# Patient Record
Sex: FEMALE | Race: WHITE | Employment: OTHER | URBAN - METROPOLITAN AREA
[De-identification: names, ages, dates, MRNs, and addresses within clinical notes are randomized per-mention and may not be internally consistent; named-entity substitution may affect disease eponyms.]

---

## 2017-01-03 ENCOUNTER — OFFICE VISIT (OUTPATIENT)
Dept: FAMILY MEDICINE | Facility: CLINIC | Age: 72
End: 2017-01-03
Payer: COMMERCIAL

## 2017-01-03 VITALS
WEIGHT: 139 LBS | SYSTOLIC BLOOD PRESSURE: 134 MMHG | HEIGHT: 64 IN | HEART RATE: 78 BPM | BODY MASS INDEX: 23.73 KG/M2 | OXYGEN SATURATION: 99 % | DIASTOLIC BLOOD PRESSURE: 82 MMHG | TEMPERATURE: 95 F

## 2017-01-03 DIAGNOSIS — M32.9 SYSTEMIC LUPUS ERYTHEMATOSUS (H): ICD-10-CM

## 2017-01-03 DIAGNOSIS — E78.5 HYPERLIPIDEMIA LDL GOAL <130: Primary | ICD-10-CM

## 2017-01-03 DIAGNOSIS — I77.6 VASCULITIS (H): ICD-10-CM

## 2017-01-03 DIAGNOSIS — Z79.899 HIGH RISK MEDICATION USE: ICD-10-CM

## 2017-01-03 DIAGNOSIS — Z29.89 NEED FOR SUBACUTE BACTERIAL ENDOCARDITIS PROPHYLAXIS: ICD-10-CM

## 2017-01-03 DIAGNOSIS — R73.02 IMPAIRED GLUCOSE TOLERANCE: ICD-10-CM

## 2017-01-03 DIAGNOSIS — R26.9 GAIT DIFFICULTY: ICD-10-CM

## 2017-01-03 LAB
ALT SERPL W P-5'-P-CCNC: 32 U/L (ref 0–50)
AST SERPL W P-5'-P-CCNC: 29 U/L (ref 0–45)
CHOLEST SERPL-MCNC: 210 MG/DL
CK SERPL-CCNC: 151 U/L (ref 30–225)
HBA1C MFR BLD: 5.9 % (ref 4.3–6)
HDLC SERPL-MCNC: 104 MG/DL
LDLC SERPL CALC-MCNC: 69 MG/DL
NONHDLC SERPL-MCNC: 106 MG/DL
TRIGL SERPL-MCNC: 185 MG/DL

## 2017-01-03 PROCEDURE — 82550 ASSAY OF CK (CPK): CPT | Performed by: FAMILY MEDICINE

## 2017-01-03 PROCEDURE — 84450 TRANSFERASE (AST) (SGOT): CPT | Performed by: FAMILY MEDICINE

## 2017-01-03 PROCEDURE — 84460 ALANINE AMINO (ALT) (SGPT): CPT | Performed by: FAMILY MEDICINE

## 2017-01-03 PROCEDURE — 83036 HEMOGLOBIN GLYCOSYLATED A1C: CPT | Performed by: FAMILY MEDICINE

## 2017-01-03 PROCEDURE — 80061 LIPID PANEL: CPT | Performed by: FAMILY MEDICINE

## 2017-01-03 PROCEDURE — 99213 OFFICE O/P EST LOW 20 MIN: CPT | Performed by: FAMILY MEDICINE

## 2017-01-03 PROCEDURE — 36415 COLL VENOUS BLD VENIPUNCTURE: CPT | Performed by: FAMILY MEDICINE

## 2017-01-03 RX ORDER — CEPHALEXIN 500 MG/1
CAPSULE ORAL
Qty: 4 CAPSULE | Refills: 3 | Status: SHIPPED | OUTPATIENT
Start: 2017-01-03 | End: 2018-03-06

## 2017-01-03 NOTE — MR AVS SNAPSHOT
After Visit Summary   1/3/2017    Marlys A Tietz    MRN: [id number]           Patient Information     Date Of Birth          1945        Visit Information        Provider Department      1/3/2017 10:30 AM Ella Rodriguez MD [facility]        Today's Diagnoses     Hyperlipidemia LDL goal <130    -  1     Gait difficulty         Need for subacute bacterial endocarditis prophylaxis         Impaired glucose tolerance           Care Instructions    [facility]    If you have any questions regarding to your visit please contact your care team:       Team Red:   Clinic Hours Telephone Number   Dr. Flora Ross  (pediatrics)  Kiara Martines NP 7am-7pm  Monday - Thursday   7am-5pm  Fridays  [phone] (fax)    Josh MOTA  [phone]   Urgent Care - [place] and [place] Monday-Friday  [place] - 11am-8pm  Saturday-Sunday  Both sites - 9am-5pm  [phone] - [facility]  [phone] - [place]       What options do I have for visits at the clinic other than the traditional office visit?  To expand how we care for you, many of our providers are utilizing electronic visits (e-visits) and telephone visits, when medically appropriate, for interactions with their patients rather than a visit in the clinic.   We also offer nurse visits for many medical concerns. Just like any other service, we will bill your insurance company for this type of visit based on time spent on the phone with your provider. Not all insurance companies cover these visits. Please check with your medical insurance if this type of visit is covered. You will be responsible for any charges that are not paid by your insurance.      E-visits via [employer]:  generally incur a $35.00 fee.  Telephone visits:  Time spent on the phone: *charged based on time that is spent on the phone in increments of 10 minutes. Estimated cost:   5-10 mins $30.00   11-20  "mins. $59.00   21-30 mins. $85.00     As always, Thank you for trusting us with your health care needs!                    Follow-ups after your visit        Who to contact     If you have questions or need follow up information about today's clinic visit or your schedule please contact [facility] directly at [phone].  Normal or non-critical lab and imaging results will be communicated to you by TradeHerohart, letter or phone within 4 business days after the clinic has received the results. If you do not hear from us within 7 days, please contact the clinic through Audastert or phone. If you have a critical or abnormal lab result, we will notify you by phone as soon as possible.  Submit refill requests through [employer] or call your pharmacy and they will forward the refill request to us. Please allow 3 business days for your refill to be completed.          Additional Information About Your Visit        TradeHeroharDivided Information     [employer] gives you secure access to your electronic health record. If you see a primary care provider, you can also send messages to your care team and make appointments. If you have questions, please call your primary care clinic.  If you do not have a primary care provider, please call [phone] and they will assist you.        Care EveryWhere ID     This is your Care EveryWhere ID. This could be used by other organizations to access your [place] medical records  [id number]        Your Vitals Were     Pulse Temperature Height BMI (Body Mass Index) Pulse Oximetry       78 95  F (35  C) (Oral) 5' 3.5\" (1.613 m) 24.23 kg/m2 99%        Blood Pressure from Last 3 Encounters:   01/03/17 134/82   09/23/16 146/80   10/06/15 137/79    Weight from Last 3 Encounters:   01/03/17 139 lb (63.05 kg)   09/23/16 137 lb (62.143 kg)   10/06/15 140 lb (63.504 kg)              We Performed the Following     ALT     AST     CK total     Hemoglobin A1c     Lipid panel reflex to direct LDL        "   Today's Medication Changes          These changes are accurate as of: 1/3/17 11:04 AM.  If you have any questions, ask your nurse or doctor.               Start taking these medicines.        Dose/Directions    cephALEXin 500 MG capsule   Commonly known as:  KEFLEX   Used for:  Need for subacute bacterial endocarditis prophylaxis   Started by:  Ella Rodriguez MD        2 grams  Half hour before Procedure   Quantity:  4 capsule   Refills:  3         Stop taking these medicines if you haven't already. Please contact your care team if you have questions.     azithromycin 250 MG tablet   Commonly known as:  ZITHROMAX   Stopped by:  Ella Rodriguez MD                Where to get your medicines      These medications were sent to [id number] - [place] - [address] [place]  [address] [place], [place] [address]     Phone:  [phone]    - cephALEXin 500 MG capsule             Primary Care Provider Office Phone # Fax #    Ella Rodriguez [phone]       [facility] [address] [facility]  [place] [address]        Thank you!     Thank you for choosing [facility]  for your care. Our goal is always to provide you with excellent care. Hearing back from our patients is one way we can continue to improve our services. Please take a few minutes to complete the written survey that you may receive in the mail after your visit with us. Thank you!             Your Updated Medication List - Protect others around you: Learn how to safely use, store and throw away your medicines at www.disposemymeds.org.          This list is accurate as of: 1/3/17 11:04 AM.  Always use your most recent med list.                   Brand Name Dispense Instructions for use    aspirin 81 MG tablet      Take  by mouth daily.       atorvastatin 20 MG tablet    LIPITOR    30 tablet    TAKE ONE TABLET BY MOUTH ONCE DAILY. THIS REPLACES SIMVASTATIN       azaTHIOprine 50 MG tablet    IMURAN      Take 50 mg by mouth daily.       BONIVA 3 MG/3ML   Generic drug:  ibandronate      Inject 3 mg into the vein once       budesonide 3 MG 24 hr capsule    ENTOCORT EC     Take 3 mg by mouth 2 times daily.       CALTRATE 600+D PO      TWO TAB DAILY       cephALEXin 500 MG capsule    KEFLEX    4 capsule    2 grams  Half hour before Procedure       lisinopril 5 MG tablet    PRINIVIL/ZESTRIL    90 tablet    Take 1 tablet (5 mg) by mouth daily       MULTIVITAMIN TABS   OR      1 TABLET DAILY       predniSONE 2.5 MG tablet    DELTASONE     Take 5 mg by mouth daily       priLOSEC 20 MG CR capsule   Generic drug:  omeprazole      1 CAPSULE DAILY       TYLENOL EXTRA STRENGTH 500 MG Tabs      1 TABLET EVERY 4 HOURS AS NEEDED

## 2017-01-03 NOTE — PATIENT INSTRUCTIONS
[facility]    If you have any questions regarding to your visit please contact your care team:       Team Red:   Clinic Hours Telephone Number   Dr. Flora Ross  (pediatrics)  Kiara Martines NP 7am-7pm  Monday - Thursday   7am-5pm  Fridays  [phone] (fax)    Josh MOTA  [phone]   Urgent Care - [place] and [place] Monday-Friday  [place] - 11am-8pm  Saturday-Sunday  Both sites - 9am-5pm  [phone] - [facility]  [phone] - [place]       What options do I have for visits at the clinic other than the traditional office visit?  To expand how we care for you, many of our providers are utilizing electronic visits (e-visits) and telephone visits, when medically appropriate, for interactions with their patients rather than a visit in the clinic.   We also offer nurse visits for many medical concerns. Just like any other service, we will bill your insurance company for this type of visit based on time spent on the phone with your provider. Not all insurance companies cover these visits. Please check with your medical insurance if this type of visit is covered. You will be responsible for any charges that are not paid by your insurance.      E-visits via [employer]:  generally incur a $35.00 fee.  Telephone visits:  Time spent on the phone: *charged based on time that is spent on the phone in increments of 10 minutes. Estimated cost:   5-10 mins $30.00   11-20 mins. $59.00   21-30 mins. $85.00     As always, Thank you for trusting us with your health care needs!

## 2017-01-03 NOTE — NURSING NOTE
"Chief Complaint   Patient presents with     Lipids       Initial /82 mmHg  Pulse 78  Temp(Src) 95  F (35  C) (Oral)  Ht 5' 3.5\" (1.613 m)  Wt 139 lb (63.05 kg)  BMI 24.23 kg/m2  SpO2 99% Estimated body mass index is 24.23 kg/(m^2) as calculated from the following:    Height as of this encounter: 5' 3.5\" (1.613 m).    Weight as of this encounter: 139 lb (63.05 kg).  BP completed using cuff size: regular left arm    Kaitlynn Bhatt MA      "

## 2017-01-03 NOTE — PROGRESS NOTES
SUBJECTIVE:                                                    Marlys A Tietz is a 71 year old female who presents to clinic today for the following health issues:      Hyperlipidemia Follow-Up      Rate your low fat/cholesterol diet?: good    Taking statin?  Yes, increase in joint aches after starting statin, possibly from other cause    Other lipid medications/supplements?:  none       Amount of exercise or physical activity: exercising daily and physical therapy 1-2 times per month    Problems taking medications regularly: No    Medication side effects: unknown    Diet: low salt and low fat/cholesterol        Problem list and histories reviewed & adjusted, as indicated.  Additional history: as documented    Patient Active Problem List   Diagnosis     HYPERLIPIDEMIA LDL GOAL <130     Vasculitis (H)     Fibromyalgia     Osteoporosis     Osteoarthritis     Systemic lupus erythematosus (H)     Advanced directives, counseling/discussion     Lymphocytic colitis     Hypertension goal BP (blood pressure) < 140/90     Impaired glucose tolerance     Diverticulitis     Diverticulitis of colon     Tear of right rotator cuff     Tear of left rotator cuff     Hip abductor tear     H/O bacterial endocarditis     H/O endocarditis     Gait difficulty     Loss of balance     Shoulder pain, bilateral     Past Surgical History   Procedure Laterality Date     No history of surgery         Social History   Substance Use Topics     Smoking status: Never Smoker      Smokeless tobacco: Never Used     Alcohol Use: 0.5 oz/week     1 drink(s) per week     Family History   Problem Relation Age of Onset     Hypertension Mother      OSTEOPOROSIS Mother          Current Outpatient Prescriptions   Medication Sig Dispense Refill     cephALEXin (KEFLEX) 500 MG capsule 2 grams  Half hour before Procedure 4 capsule 3     atorvastatin (LIPITOR) 20 MG tablet TAKE ONE TABLET BY MOUTH ONCE DAILY. THIS REPLACES SIMVASTATIN 30 tablet 0     lisinopril  (PRINIVIL,ZESTRIL) 5 MG tablet Take 1 tablet (5 mg) by mouth daily 90 tablet 3     budesonide (ENTOCORT EC) 3 MG 24 hr capsule Take 3 mg by mouth 2 times daily.       predniSONE (DELTASONE) 2.5 MG tablet Take 5 mg by mouth daily        azathioprine (IMURAN) 50 MG tablet Take 50 mg by mouth daily.       CALTRATE 600+D OR TWO TAB DAILY       PRILOSEC 20 MG OR CPDR 1 CAPSULE DAILY       aspirin 81 MG tablet Take  by mouth daily.       MULTIVITAMIN TABS   OR 1 TABLET DAILY       TYLENOL EXTRA STRENGTH 500 MG OR TABS 1 TABLET EVERY 4 HOURS AS NEEDED       ibandronate (BONIVA) 3 MG/3ML Inject 3 mg into the vein once       Allergies   Allergen Reactions     Penicillins      Reaction unknown     Sulfa Drugs      Was told by mother     Recent Labs   Lab Test  09/23/16   1156 08/18/15 08/15/15  07/22/15   1650  02/18/15   1036   02/20/14   0942   LDL  102*   --    --    --   76   --   95   HDL  86   --    --    --   102   --   97   TRIG  167*   --    --    --   183*   --   98   ALT   --    --   20  25  30   --   29   CR  0.79  0.8  1.0  0.87  0.76   < >  0.79   GFRESTIMATED  72   --   58*  64  76   < >  72   GFRESTBLACK  87   --   >60  77  >90   GFR Calc     < >  87   POTASSIUM  4.4  4.6  4.4  4.5  4.3   < >  3.9   TSH   --    --    --    --    --    --   1.82    < > = values in this interval not displayed.      BP Readings from Last 3 Encounters:   01/03/17 134/82   09/23/16 146/80   10/06/15 137/79    Wt Readings from Last 3 Encounters:   01/03/17 139 lb (63.05 kg)   09/23/16 137 lb (62.143 kg)   10/06/15 140 lb (63.504 kg)                  Labs reviewed in EPIC  Problem list, Medication list, Allergies, and Medical/Social/Surgical histories reviewed in [facility] and updated as appropriate.    ROS:  C: NEGATIVE for fever, chills, change in weight  E/M: NEGATIVE for ear, mouth and throat problems  R: NEGATIVE for significant cough or SOB  CV: NEGATIVE for chest pain, palpitations or peripheral edema    OBJECTIVE:   "                                                  /82 mmHg  Pulse 78  Temp(Src) 95  F (35  C) (Oral)  Ht 5' 3.5\" (1.613 m)  Wt 139 lb (63.05 kg)  BMI 24.23 kg/m2  SpO2 99%  Body mass index is 24.23 kg/(m^2).  GENERAL: healthy, alert and no distress  NECK: no adenopathy, no asymmetry, masses, or scars and thyroid normal to palpation  RESP: lungs clear to auscultation - no rales, rhonchi or wheezes  CV: regular rate and rhythm, normal S1 S2, no S3 or S4, no murmur, click or rub, no peripheral edema and peripheral pulses strong  ABDOMEN: soft, nontender, no hepatosplenomegaly, no masses and bowel sounds normal  MS: no gross musculoskeletal defects noted, no edema    Diagnostic Test Results:  Pending      ASSESSMENT/PLAN:                                                        1. Hyperlipidemia LDL goal <130  Doing well  - Lipid panel reflex to direct LDL  - ALT  - AST  - CK total    2. Gait difficulty  Uses cane    3. Systemic lupus erythematosus (H)  Sees Rheumatology    4. Vasculitis (H)      5. High risk medication use  As above-see List-doing well    6. Impaired glucose tolerance  Advised watch diet  - Hemoglobin A1c    7. Need for subacute bacterial endocarditis prophylaxis    - cephALEXin (KEFLEX) 500 MG capsule; 2 grams  Half hour before Procedure  Dispense: 4 capsule; Refill: 3  SEE [facility] care orders  The potential side effects of this medication have been discussed with the patient.  Call if any significant problems with these are experienced.  Follow up 6 months  Ella Rodriguez MD  [facility]    "

## 2017-01-05 DIAGNOSIS — E78.5 HYPERLIPIDEMIA LDL GOAL <130: Primary | ICD-10-CM

## 2017-01-06 RX ORDER — ATORVASTATIN CALCIUM 20 MG/1
TABLET, FILM COATED ORAL
Qty: 30 TABLET | Refills: 10 | Status: SHIPPED | OUTPATIENT
Start: 2017-01-06 | End: 2017-01-06

## 2017-01-06 RX ORDER — ATORVASTATIN CALCIUM 20 MG/1
TABLET, FILM COATED ORAL
Qty: 30 TABLET | Refills: 10 | Status: SHIPPED | OUTPATIENT
Start: 2017-01-06 | End: 2018-03-06

## 2017-01-06 NOTE — TELEPHONE ENCOUNTER
Atorvastatin      Last Written Prescription Date: 12/02/2016  Last Fill Quantity: 30, # refills: 0  Last Office Visit with [facility], [facility] or [facility] prescribing provider: 01/03/2017     CHOL      210   1/3/2017  HDL      104   1/3/2017  LDL       69   1/3/2017  TRIG      185   1/3/2017  CHOLHDLRATIO      2.1   2/18/2015

## 2017-01-06 NOTE — TELEPHONE ENCOUNTER
Reason for Call:  Other prescription    Detailed comments: Patient is upset that the prescription atorvastatin hasnt been refilled. Patient was in earlier this week and already had labs done. Patient is left of not knowing what she needs to take if the refill is not going to be approved. Patient wants this refill ASAP    Phone Number Patient can be reached at: Home number on file [phone] (home)    Best Time: any time    Can we leave a detailed message on this number? YES    Call taken on 1/5/2017 at 6:37 PM by Judit Torres

## 2017-01-06 NOTE — TELEPHONE ENCOUNTER
Prescription approved per [facility] Refill Protocol.    Signed Prescriptions:                        Disp   Refills    atorvastatin (LIPITOR) 20 MG tablet        30 tab*10       Sig: TAKE ONE TABLET BY MOUTH ONCE DAILY - NEEDS TO BE           SEEN IN CLINIC FOR LABS.  Authorizing Provider: WILLEM COATES  Ordering User: CLARISSA ALCARAZ, RN, BSN

## 2017-02-03 ENCOUNTER — THERAPY VISIT (OUTPATIENT)
Dept: PHYSICAL THERAPY | Facility: CLINIC | Age: 72
End: 2017-02-03
Payer: COMMERCIAL

## 2017-02-03 DIAGNOSIS — G89.29 CHRONIC PAIN OF BOTH SHOULDERS: ICD-10-CM

## 2017-02-03 DIAGNOSIS — M25.512 CHRONIC PAIN OF BOTH SHOULDERS: ICD-10-CM

## 2017-02-03 DIAGNOSIS — R26.89 LOSS OF BALANCE: ICD-10-CM

## 2017-02-03 DIAGNOSIS — R26.9 GAIT DIFFICULTY: Primary | ICD-10-CM

## 2017-02-03 DIAGNOSIS — M25.511 CHRONIC PAIN OF BOTH SHOULDERS: ICD-10-CM

## 2017-02-03 PROCEDURE — G8978 MOBILITY CURRENT STATUS: HCPCS | Mod: GP | Performed by: PHYSICAL THERAPIST

## 2017-02-03 PROCEDURE — G8979 MOBILITY GOAL STATUS: HCPCS | Mod: GP | Performed by: PHYSICAL THERAPIST

## 2017-02-03 PROCEDURE — 97530 THERAPEUTIC ACTIVITIES: CPT | Mod: GP | Performed by: PHYSICAL THERAPIST

## 2017-02-03 PROCEDURE — 97110 THERAPEUTIC EXERCISES: CPT | Mod: GP | Performed by: PHYSICAL THERAPIST

## 2017-02-03 PROCEDURE — 97112 NEUROMUSCULAR REEDUCATION: CPT | Mod: GP | Performed by: PHYSICAL THERAPIST

## 2017-02-19 ENCOUNTER — OFFICE VISIT (OUTPATIENT)
Dept: URGENT CARE | Facility: URGENT CARE | Age: 72
End: 2017-02-19
Payer: COMMERCIAL

## 2017-02-19 VITALS
HEART RATE: 118 BPM | SYSTOLIC BLOOD PRESSURE: 144 MMHG | WEIGHT: 141.3 LBS | BODY MASS INDEX: 24.64 KG/M2 | TEMPERATURE: 98 F | DIASTOLIC BLOOD PRESSURE: 90 MMHG | OXYGEN SATURATION: 94 %

## 2017-02-19 DIAGNOSIS — J06.9 VIRAL UPPER RESPIRATORY TRACT INFECTION: Primary | ICD-10-CM

## 2017-02-19 PROCEDURE — 99213 OFFICE O/P EST LOW 20 MIN: CPT | Performed by: NURSE PRACTITIONER

## 2017-02-19 RX ORDER — TRAMADOL HYDROCHLORIDE 50 MG/1
TABLET ORAL
COMMUNITY
Start: 2016-10-27 | End: 2017-06-02

## 2017-02-19 RX ORDER — TRETINOIN 1 MG/G
CREAM TOPICAL
COMMUNITY
Start: 2016-08-10 | End: 2019-06-14

## 2017-02-19 RX ORDER — CETIRIZINE HYDROCHLORIDE 10 MG/1
10 TABLET ORAL EVERY EVENING
Qty: 14 TABLET | Refills: 0 | Status: SHIPPED | OUTPATIENT
Start: 2017-02-19 | End: 2017-03-05

## 2017-02-19 NOTE — MR AVS SNAPSHOT
After Visit Summary   2/19/2017    Marlys A Tietz    MRN: [id number]           Patient Information     Date Of Birth          1945        Visit Information        Provider Department      2/19/2017 3:25 PM Kailee Russ NP [facility]        Today's Diagnoses     Viral upper respiratory tract infection    -  1      Care Instructions      Viral Upper Respiratory Illness (Adult)  You have a viral upper respiratory illness (URI), which is another term for the common cold. This illness is contagious during the first few days. It is spread through the air by coughing and sneezing. It may also be spread by direct contact (touching the sick person and then touching your own eyes, nose, or mouth). Frequent handwashing will decrease risk of spread. Most viral illnesses go away within 7 to 10 days with rest and simple home remedies. Sometimes the illness may last for several weeks. Antibiotics will not kill a virus, and they are generally not prescribed for this condition.    Home care    If symptoms are severe, rest at home for the first 2 to 3 days. When you resume activity, don't let yourself get too tired.    Avoid being exposed to cigarette smoke (yours or others ).    You may use acetaminophen or ibuprofen to control pain and fever, unless another medicine was prescribed. (Note: If you have chronic liver or kidney disease, have ever had a stomach ulcer or gastrointestinal bleeding, or are taking blood-thinning medicines, talk with your healthcare provider before using these medicines.) Aspirin should never be given to anyone under 18 years of age who is ill with a viral infection or fever. It may cause severe liver or brain damage.    Your appetite may be poor, so a light diet is fine. Avoid dehydration by drinking 6 to 8 glasses of fluids per day (water, soft drinks, juices, tea, or soup). Extra fluids will help loosen secretions in the nose and lungs.    Over-the-counter cold  medicines will not shorten the length of time you re sick, but they may be helpful for the following symptoms: cough, sore throat, and nasal and sinus congestion. (Note: Do not use decongestants if you have high blood pressure.)  Follow-up care  Follow up with your healthcare provider, or as advised.  When to seek medical advice  Call your healthcare provider right away if any of these occur:    Cough with lots of colored sputum (mucus)    Severe headache; face, neck, or ear pain    Difficulty swallowing due to throat pain    Fever of 100.4 F (38 C)  Call 911, or get immediate medical care  Call emergency services right away if any of these occur:    Chest pain, shortness of breath, wheezing, or difficulty breathing    Coughing up blood    Inability to swallow due to throat pain    9241-9018 [employer]. [address] [place] [address], [place] [address]. All rights reserved. This information is not intended as a substitute for professional medical care. Always follow your healthcare professional's instructions.              Follow-ups after your visit        Who to contact     If you have questions or need follow up information about today's clinic visit or your schedule please contact [facility] directly at [phone].  Normal or non-critical lab and imaging results will be communicated to you by MyChart, letter or phone within 4 business days after the clinic has received the results. If you do not hear from us within 7 days, please contact the clinic through Outboxhart or phone. If you have a critical or abnormal lab result, we will notify you by phone as soon as possible.  Submit refill requests through [employer] or call your pharmacy and they will forward the refill request to us. Please allow 3 business days for your refill to be completed.          Additional Information About Your Visit        [employer] Information     [employer] gives you secure access to your electronic health record. If  you see a primary care provider, you can also send messages to your care team and make appointments. If you have questions, please call your primary care clinic.  If you do not have a primary care provider, please call [phone] and they will assist you.        Care EveryWhere ID     This is your Care EveryWhere ID. This could be used by other organizations to access your [place] medical records  [id number]        Your Vitals Were     Pulse Temperature Pulse Oximetry BMI (Body Mass Index)          118 98  F (36.7  C) (Oral) 94% 24.64 kg/m2         Blood Pressure from Last 3 Encounters:   02/19/17 144/90   01/03/17 134/82   09/23/16 146/80    Weight from Last 3 Encounters:   02/19/17 141 lb 4.8 oz (64.1 kg)   01/03/17 139 lb (63 kg)   09/23/16 137 lb (62.1 kg)              Today, you had the following     No orders found for display         Today's Medication Changes          These changes are accurate as of: 2/19/17  4:52 PM.  If you have any questions, ask your nurse or doctor.               Start taking these medicines.        Dose/Directions    cetirizine 10 MG tablet   Commonly known as:  zyrTEC   Used for:  Viral upper respiratory tract infection   Started by:  Kailee Russ NP        Dose:  10 mg   Take 1 tablet (10 mg) by mouth every evening for 14 days   Quantity:  14 tablet   Refills:  0            Where to get your medicines      These medications were sent to [facility] - [place], [place] - [address] [facility] [address]  [address] [facility] [address], [place] [address]     Phone:  [phone]     cetirizine 10 MG tablet                Primary Care Provider Office Phone # Fax #    Ella Rodriguez [phone]       [address] [address]        Thank you!     Thank you for choosing [facility]  for your care. Our goal is always to provide you with excellent care. Hearing back from our  patients is one way we can continue to improve our services. Please take a few minutes to complete the written survey that you may receive in the mail after your visit with us. Thank you!             Your Updated Medication List - Protect others around you: Learn how to safely use, store and throw away your medicines at www.disposemymeds.org.          This list is accurate as of: 2/19/17  4:52 PM.  Always use your most recent med list.                   Brand Name Dispense Instructions for use    aspirin 81 MG tablet      Take  by mouth daily.       atorvastatin 20 MG tablet    LIPITOR    30 tablet    TAKE ONE TABLET BY MOUTH ONCE DAILY -       azaTHIOprine 50 MG tablet    IMURAN     Take 50 mg by mouth daily.       BONIVA 3 MG/3ML   Generic drug:  ibandronate      Inject 3 mg into the vein once       budesonide 3 MG 24 hr capsule    ENTOCORT EC     Take 3 mg by mouth 2 times daily.       CALTRATE 600+D PO      TWO TAB DAILY       cephALEXin 500 MG capsule    KEFLEX    4 capsule    2 grams  Half hour before Procedure       cetirizine 10 MG tablet    zyrTEC    14 tablet    Take 1 tablet (10 mg) by mouth every evening for 14 days       lisinopril 5 MG tablet    PRINIVIL/ZESTRIL    90 tablet    Take 1 tablet (5 mg) by mouth daily       MULTIVITAMIN TABS   OR      1 TABLET DAILY       predniSONE 2.5 MG tablet    DELTASONE     Take 5 mg by mouth daily       priLOSEC 20 MG CR capsule   Generic drug:  omeprazole      1 CAPSULE DAILY       traMADol 50 MG tablet    ULTRAM         tretinoin 0.1 % cream    RETIN-A         TYLENOL EXTRA STRENGTH 500 MG Tabs      1 TABLET EVERY 4 HOURS AS NEEDED

## 2017-02-19 NOTE — PATIENT INSTRUCTIONS

## 2017-02-19 NOTE — NURSING NOTE
"Chief Complaint   Patient presents with     Back Pain       Initial /90 (BP Location: Left arm, Patient Position: Chair, Cuff Size: Adult Regular)  Pulse 118  Temp 98  F (36.7  C) (Oral)  Wt 141 lb 4.8 oz (64.1 kg)  SpO2 94%  BMI 24.64 kg/m2 Estimated body mass index is 24.64 kg/(m^2) as calculated from the following:    Height as of 1/3/17: 5' 3.5\" (1.613 m).    Weight as of this encounter: 141 lb 4.8 oz (64.1 kg).  Medication Reconciliation: complete     Mabel Kerns MA        "

## 2017-02-19 NOTE — PROGRESS NOTES
"  SUBJECTIVE:                                                    Marlys A Tietz is a 72 year old female who presents to clinic today for the following health issues:      Back Pain      Duration: 2/14/2017        Specific cause: fall     Description:   Location of pain: upper back bilateral  Character of pain: dull ache  Pain radiation:middle of chest  New numbness or weakness in legs, not attributed to pain:  no     Intensity: Currently 6/10    History:   Pain interferes with job: Not applicable  History of back problems: no prior back problems  Any previous MRI or X-rays: None  Sees a specialist for back pain:  No  Therapies tried without relief: acetaminophen (Tylenol) and NSAIDs    Alleviating factors:   Improved by: none      Precipitating factors:  Worsened by: [place]    Functional and Psychosocial Screen (Daja STarT Back):      Not performed today    Was at the \"[employer] shop\" on Friday and pt states later that night neck started hurting and is unsure if when hair was being washed that with tilting back could have done something to neck and back   Accompanying Signs & Symptoms:  Risk of Fracture:  None  Risk of Cauda Equina:  None  Risk of Infection:  None  Risk of Cancer:  None  Risk of Ankylosing Spondylitis:  Onset at age <35, male, AND morning back stiffness. YES                 RESPIRATORY SYMPTOMS      Duration: 1 week    Description  nasal congestion and rhinorrhea    Severity: mild    Accompanying signs and symptoms: None    History (predisposing factors):  none    Precipitating or alleviating factors: None    Therapies tried and outcome:  rest and fluids acetaminophen           Allergies   Allergen Reactions     Penicillins      Reaction unknown     Sulfa Drugs      Was told by mother       Past Medical History   Diagnosis Date     H/O bacterial endocarditis 1979     heart valves     Hyperlipidemia      Lupus (H)      MEDICAL HISTORY OF -      Pt needs antibiotics before Dental work< History of " endocarditis         Current Outpatient Prescriptions on File Prior to Visit:  atorvastatin (LIPITOR) 20 MG tablet TAKE ONE TABLET BY MOUTH ONCE DAILY -   cephALEXin (KEFLEX) 500 MG capsule 2 grams  Half hour before Procedure   lisinopril (PRINIVIL,ZESTRIL) 5 MG tablet Take 1 tablet (5 mg) by mouth daily   ibandronate (BONIVA) 3 MG/3ML Inject 3 mg into the vein once   budesonide (ENTOCORT EC) 3 MG 24 hr capsule Take 3 mg by mouth 2 times daily.   predniSONE (DELTASONE) 2.5 MG tablet Take 5 mg by mouth daily    azathioprine (IMURAN) 50 MG tablet Take 50 mg by mouth daily.   CALTRATE 600+D OR TWO TAB DAILY   PRILOSEC 20 MG OR CPDR 1 CAPSULE DAILY   aspirin 81 MG tablet Take  by mouth daily.   MULTIVITAMIN TABS   OR 1 TABLET DAILY   TYLENOL EXTRA STRENGTH 500 MG OR TABS 1 TABLET EVERY 4 HOURS AS NEEDED     No current facility-administered medications on file prior to visit.     Social History   Substance Use Topics     Smoking status: Never Smoker     Smokeless tobacco: Never Used     Alcohol use 0.5 oz/week     1 drink(s) per week       ROS:  Consitutional: As above  ENT: As above  Respiratory: As above    OBJECTIVE:  /90 (BP Location: Left arm, Patient Position: Chair, Cuff Size: Adult Regular)  Pulse 118  Temp 98  F (36.7  C) (Oral)  Wt 141 lb 4.8 oz (64.1 kg)  SpO2 94%  BMI 24.64 kg/m2  GENERAL APPEARANCE: healthy, alert and no distress  EYES: conjunctiva clear  EARS:small cerumen.   Ear canals w/o erythema, TM's intact w/o erythema.    NOSE/MOUTH: clear nasal drainage. Nose and mouth without ulcers, erythema or lesions  THROAT: mild erythema w/ no tonsillar enlargement . no exudates  NECK: supple, nontender, no lymphadenopathy  RESP: lungs clear to auscultation - no rales, rhonchi or wheezes  CV: regular rates and rhythm, normal S1 S2, no murmur noted  NEURO: awake, alert        ASSESSMENT:     ICD-10-CM    1. Viral upper respiratory tract infection J06.9 cetirizine (ZYRTEC) 10 MG tablet    B97.89         PLAN:  Oanh has tramadol and tylenol at home, advised to use them for back pain. Use ice and rest.   Lots of rest and fluids.  RTC if any worsening symptoms or if not improving.    Kailee BUTCHERP-BC

## 2017-03-03 ENCOUNTER — OFFICE VISIT (OUTPATIENT)
Dept: FAMILY MEDICINE | Facility: CLINIC | Age: 72
End: 2017-03-03
Payer: COMMERCIAL

## 2017-03-03 VITALS
TEMPERATURE: 95.2 F | DIASTOLIC BLOOD PRESSURE: 75 MMHG | BODY MASS INDEX: 23.73 KG/M2 | HEIGHT: 64 IN | SYSTOLIC BLOOD PRESSURE: 129 MMHG | OXYGEN SATURATION: 100 % | RESPIRATION RATE: 28 BRPM | WEIGHT: 139 LBS | HEART RATE: 120 BPM
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
## 2017-03-03 DIAGNOSIS — J06.9 UPPER RESPIRATORY TRACT INFECTION, UNSPECIFIED TYPE: Primary | ICD-10-CM

## 2017-03-03 PROCEDURE — 99213 OFFICE O/P EST LOW 20 MIN: CPT | Performed by: FAMILY MEDICINE

## 2017-03-03 NOTE — MR AVS SNAPSHOT
After Visit Summary   3/3/2017    Marlys A Tietz    MRN: [id number]           Patient Information     Date Of Birth          1945        Visit Information        Provider Department      3/3/2017 12:20 PM Ella Rodriguez MD [facility] Instructions    [facility]    If you have any questions regarding to your visit please contact your care team:       Team Red:   Clinic Hours Telephone Number   Dr. Flora Martines NP   7am-7pm  Monday - Thursday   7am-5pm  Fridays  [phone]  (Appointment scheduling available 24/7)    Questions about your visit?   Team Line  [phone]   Urgent Care - [place] and [place] - 11am-9pm Monday-Friday Saturday-Sunday- 9am-5pm   [place] - 5pm-9pm Monday-Friday Saturday-Sunday- 9am-5pm  [phone] - [place]   [phone] - [place]       What options do I have for visits at the clinic other than the traditional office visit?  To expand how we care for you, many of our providers are utilizing electronic visits (e-visits) and telephone visits, when medically appropriate, for interactions with their patients rather than a visit in the clinic.   We also offer nurse visits for many medical concerns. Just like any other service, we will bill your insurance company for this type of visit based on time spent on the phone with your provider. Not all insurance companies cover these visits. Please check with your medical insurance if this type of visit is covered. You will be responsible for any charges that are not paid by your insurance.      E-visits via [employer]:  generally incur a $35.00 fee.  Telephone visits:  Time spent on the phone: *charged based on time that is spent on the phone in increments of 10 minutes. Estimated cost:   5-10 mins $30.00   11-20 mins. $59.00   21-30 mins. $85.00     Use [employer] (secure email communication and access to your chart) to  "send your primary care provider a message or make an appointment. Ask someone on your Team how to sign up for [employer].  For a Price Quote for your services, please call our [employer] Price Line at [phone].      As always, Thank you for trusting us with your health care needs!  Ghada KATZ MA          Follow-ups after your visit        Who to contact     If you have questions or need follow up information about today's clinic visit or your schedule please contact [facility] directly at [phone].  Normal or non-critical lab and imaging results will be communicated to you by [employer], letter or phone within 4 business days after the clinic has received the results. If you do not hear from us within 7 days, please contact the clinic through FOR[employer]t or phone. If you have a critical or abnormal lab result, we will notify you by phone as soon as possible.  Submit refill requests through Endorse or call your pharmacy and they will forward the refill request to us. Please allow 3 business days for your refill to be completed.          Additional Information About Your Visit        Endorse Information     Endorse gives you secure access to your electronic health record. If you see a primary care provider, you can also send messages to your care team and make appointments. If you have questions, please call your primary care clinic.  If you do not have a primary care provider, please call [phone] and they will assist you.        Care EveryWhere ID     This is your Care EveryWhere ID. This could be used by other organizations to access your [place] medical records  [id number]        Your Vitals Were     Pulse Temperature Respirations Height Pulse Oximetry Breastfeeding?    120 95.2  F (35.1  C) (Oral) 28 5' 3.5\" (1.613 m) 100% No    BMI (Body Mass Index)                   24.24 kg/m2            Blood Pressure from Last 3 Encounters:   03/03/17 129/75   02/19/17 144/90   01/03/17 134/82    Weight from " Last 3 Encounters:   03/03/17 139 lb (63 kg)   02/19/17 141 lb 4.8 oz (64.1 kg)   01/03/17 139 lb (63 kg)              Today, you had the following     No orders found for display       Primary Care Provider Office Phone # Fax #    Ella Rodriguez [phone]       [address] [facility] [address]        Thank you!     Thank you for choosing [facility]  for your care. Our goal is always to provide you with excellent care. Hearing back from our patients is one way we can continue to improve our services. Please take a few minutes to complete the written survey that you may receive in the mail after your visit with us. Thank you!             Your Updated Medication List - Protect others around you: Learn how to safely use, store and throw away your medicines at www.disposemymeds.org.          This list is accurate as of: 3/3/17  1:07 PM.  Always use your most recent med list.                   Brand Name Dispense Instructions for use    aspirin 81 MG tablet      Take  by mouth daily.       atorvastatin 20 MG tablet    LIPITOR    30 tablet    TAKE ONE TABLET BY MOUTH ONCE DAILY -       azaTHIOprine 50 MG tablet    IMURAN     Take 50 mg by mouth daily.       BONIVA 3 MG/3ML   Generic drug:  ibandronate      Inject 3 mg into the vein once       budesonide 3 MG 24 hr capsule    ENTOCORT EC     Take 3 mg by mouth 2 times daily.       CALTRATE 600+D PO      TWO TAB DAILY       cephALEXin 500 MG capsule    KEFLEX    4 capsule    2 grams  Half hour before Procedure       cetirizine 10 MG tablet    zyrTEC    14 tablet    Take 1 tablet (10 mg) by mouth every evening for 14 days       lisinopril 5 MG tablet    PRINIVIL/ZESTRIL    90 tablet    Take 1 tablet (5 mg) by mouth daily       MULTIVITAMIN TABS   OR      1 TABLET DAILY       predniSONE 2.5 MG tablet    DELTASONE     Take 5 mg by mouth daily 4 for the first week 20mg,  3.5 the next 17.5mg, 3 the next 15mg, 2.5 the  next 12.5mg, 2 the next 10mg, 1.5 the next 7.5mg, finally ending with 1 5mg       priLOSEC 20 MG CR capsule   Generic drug:  omeprazole      1 CAPSULE DAILY       traMADol 50 MG tablet    ULTRAM         tretinoin 0.1 % cream    RETIN-A         TYLENOL EXTRA STRENGTH 500 MG Tabs      1 TABLET EVERY 4 HOURS AS NEEDED

## 2017-03-03 NOTE — NURSING NOTE
"Chief Complaint   Patient presents with     URI     Urgent Care     Wound Check     a fall that had happened on 02/14/2017 injuried both left knee, right elbow yellow pus in area        Initial /75 (BP Location: Left arm, Patient Position: Chair, Cuff Size: Adult Regular)  Pulse 120  Temp 95.2  F (35.1  C) (Oral)  Resp 28  Ht 5' 3.5\" (1.613 m)  Wt 139 lb (63 kg)  SpO2 100%  Breastfeeding? No  BMI 24.24 kg/m2 Estimated body mass index is 24.24 kg/(m^2) as calculated from the following:    Height as of this encounter: 5' 3.5\" (1.613 m).    Weight as of this encounter: 139 lb (63 kg).  Medication Reconciliation: complete    "

## 2017-03-03 NOTE — PATIENT INSTRUCTIONS
[facility]    If you have any questions regarding to your visit please contact your care team:       Team Red:   Clinic Hours Telephone Number   Dr. Flora Martines, NP   7am-7pm  Monday - Thursday   7am-5pm  Fridays  [phone]  (Appointment scheduling available 24/7)    Questions about your visit?   Team Line  [phone]   Urgent Care - [place] and [place] - 11am-9pm Monday-Friday Saturday-Sunday- 9am-5pm   [place] - 5pm-9pm Monday-Friday Saturday-Sunday- 9am-5pm  [phone] - [place]   [phone] - [place]       What options do I have for visits at the clinic other than the traditional office visit?  To expand how we care for you, many of our providers are utilizing electronic visits (e-visits) and telephone visits, when medically appropriate, for interactions with their patients rather than a visit in the clinic.   We also offer nurse visits for many medical concerns. Just like any other service, we will bill your insurance company for this type of visit based on time spent on the phone with your provider. Not all insurance companies cover these visits. Please check with your medical insurance if this type of visit is covered. You will be responsible for any charges that are not paid by your insurance.      E-visits via [employer]:  generally incur a $35.00 fee.  Telephone visits:  Time spent on the phone: *charged based on time that is spent on the phone in increments of 10 minutes. Estimated cost:   5-10 mins $30.00   11-20 mins. $59.00   21-30 mins. $85.00     Use [employer] (secure email communication and access to your chart) to send your primary care provider a message or make an appointment. Ask someone on your Team how to sign up for [employer].  For a Price Quote for your services, please call our Consumer Price Line at [phone].      As always, Thank you for trusting us with your health care needs!  Ghada KATZ  MA

## 2017-03-03 NOTE — PROGRESS NOTES
SUBJECTIVE:                                                    Marlys A Tietz is a 72 year old female who presents to clinic today for the following health issues:  Chief Complaint   Patient presents with     URI     Urgent Care     Wound Check     a fall that had happened on 02/14/2017     ED/UC Followup:    Facility:  [facility]   Date of visit: 02/19/2017  Reason for visit: URI  Current Status: doing better  Zyrtec helped     RESPIRATORY SYMPTOMS      Duration: x 2.5 weeks+     Description  nasal congestion, facial pain/pressure, cough, fatigue/malaise and hoarse voice  No has Clear nasal congestion and Feels she is getting better    Severity: moderate    Accompanying signs and symptoms: None    History (predisposing factors):  none    Precipitating or alleviating factors: was put on prednisone, Zyrtec     Therapies tried and outcome:  rest and fluids   Pt also fell down and has a would Left Knee and Here for a follow up from Urgent   Care  She has another would Right arm  Notes reviewed Urgent care      Problem list and histories reviewed & adjusted, as indicated.  Additional history: as documented    Patient Active Problem List   Diagnosis     HYPERLIPIDEMIA LDL GOAL <130     Vasculitis (H)     Fibromyalgia     Osteoporosis     Osteoarthritis     Systemic lupus erythematosus (H)     Advanced directives, counseling/discussion     Lymphocytic colitis     Hypertension goal BP (blood pressure) < 140/90     Impaired glucose tolerance     Diverticulitis     Diverticulitis of colon     Tear of right rotator cuff     Tear of left rotator cuff     Hip abductor tear     H/O bacterial endocarditis     H/O endocarditis     Gait difficulty     Loss of balance     Shoulder pain, bilateral     High risk medication use     Past Surgical History   Procedure Laterality Date     No history of surgery         Social History   Substance Use Topics     Smoking status: Never Smoker     Smokeless tobacco: Never Used     Alcohol  use 0.5 oz/week     1 Standard drinks or equivalent per week     Family History   Problem Relation Age of Onset     Hypertension Mother      OSTEOPOROSIS Mother          Current Outpatient Prescriptions   Medication Sig Dispense Refill     traMADol (ULTRAM) 50 MG tablet        tretinoin (RETIN-A) 0.1 % cream        cetirizine (ZYRTEC) 10 MG tablet Take 1 tablet (10 mg) by mouth every evening for 14 days 14 tablet 0     atorvastatin (LIPITOR) 20 MG tablet TAKE ONE TABLET BY MOUTH ONCE DAILY - 30 tablet 10     cephALEXin (KEFLEX) 500 MG capsule 2 grams  Half hour before Procedure 4 capsule 3     lisinopril (PRINIVIL,ZESTRIL) 5 MG tablet Take 1 tablet (5 mg) by mouth daily 90 tablet 3     ibandronate (BONIVA) 3 MG/3ML Inject 3 mg into the vein once       budesonide (ENTOCORT EC) 3 MG 24 hr capsule Take 3 mg by mouth 2 times daily.       predniSONE (DELTASONE) 2.5 MG tablet Take 5 mg by mouth daily 4 for the first week 20mg,  3.5 the next 17.5mg, 3 the next 15mg, 2.5 the next 12.5mg, 2 the next 10mg, 1.5 the next 7.5mg, finally ending with 1 5mg       azathioprine (IMURAN) 50 MG tablet Take 50 mg by mouth daily.       CALTRATE 600+D OR TWO TAB DAILY       PRILOSEC 20 MG OR CPDR 1 CAPSULE DAILY       aspirin 81 MG tablet Take  by mouth daily.       MULTIVITAMIN TABS   OR 1 TABLET DAILY       TYLENOL EXTRA STRENGTH 500 MG OR TABS 1 TABLET EVERY 4 HOURS AS NEEDED       Allergies   Allergen Reactions     Penicillins      Reaction unknown     Sulfa Drugs      Was told by mother     Recent Labs   Lab Test  01/03/17   1115  09/23/16   1156 08/18/15 08/15/15  07/22/15   1650  02/18/15   1036   02/20/14   0942   A1C  5.9   --    --    --    --    --    --    --    LDL  69  102*   --    --    --   76   --   95   HDL  104  86   --    --    --   102   --   97   TRIG  185*  167*   --    --    --   183*   --   98   ALT  32   --    --   20  25  30   --   29   CR   --   0.79  0.8  1.0  0.87  0.76   < >  0.79   GFRESTIMATED   --   72    "--   58*  64  76   < >  72   GFRESTBLACK   --   87   --   >60  77  >90   GFR Calc     < >  87   POTASSIUM   --   4.4  4.6  4.4  4.5  4.3   < >  3.9   TSH   --    --    --    --    --    --    --   1.82    < > = values in this interval not displayed.      BP Readings from Last 3 Encounters:   03/03/17 129/75   02/19/17 144/90   01/03/17 134/82    Wt Readings from Last 3 Encounters:   03/03/17 139 lb (63 kg)   02/19/17 141 lb 4.8 oz (64.1 kg)   01/03/17 139 lb (63 kg)                  Labs reviewed in EPIC    Reviewed and updated as needed this visit by clinical staff  Tobacco  Allergies  Meds  Med Hx  Surg Hx  Fam Hx  Soc Hx      Reviewed and updated as needed this visit by Provider         ROS:  C: NEGATIVE for fever, chills, change in weight  ENT/MOUTH: as above  RESP:no cough,no sob  CV: NEGATIVE for chest pain, palpitations or peripheral edema  GI: NEGATIVE for nausea, abdominal pain, heartburn, or change in bowel habits  MUSCULOSKELETAL: as above  No Joint pain  NEURO: NEGATIVE for weakness, dizziness or paresthesias    OBJECTIVE:                                                    /75 (BP Location: Left arm, Patient Position: Chair, Cuff Size: Adult Regular)  Pulse 120  Temp 95.2  F (35.1  C) (Oral)  Resp 28  Ht 5' 3.5\" (1.613 m)  Wt 139 lb (63 kg)  SpO2 100%  Breastfeeding? No  BMI 24.24 kg/m2  Body mass index is 24.24 kg/(m^2).  GENERAL: alert and no distress  EYES: Eyes grossly normal to inspection, PERRL and conjunctivae and sclerae normal  HENT: ear canals and TM's normal, nose and mouth without ulcers or lesions  NECK: no adenopathy, no asymmetry, masses, or scars and thyroid normal to palpation  RESP: lungs clear to auscultation - no rales, rhonchi or wheezes  CV: regular rate and rhythm, normal S1 S2, no S3 or S4, no murmur, click or rub, no peripheral edema and peripheral pulses strong  ABDOMEN: soft, nontender, no hepatosplenomegaly, no masses and bowel sounds normal  MS: no " gross musculoskeletal defects noted, no edema  Left knee laceration 4 cm -mild erythema,no drainage  Superficial laceration Right elbow-is Healing well    Diagnostic Test Results:  none      ASSESSMENT/PLAN:                                                        1. Upper respiratory tract infection, unspecified type  Advised symptomatic Treatment  Pt is feeling better    2. Laceration  Advised use Bacitracin daily  Follow up if not better/worse    3. Fall, subsequent encounter  Follow up 1 week if not better/sooner if worse          Ella Rodriguez MD  [facility]

## 2017-04-24 ENCOUNTER — TRANSFERRED RECORDS (OUTPATIENT)
Dept: HEALTH INFORMATION MANAGEMENT | Facility: CLINIC | Age: 72
End: 2017-04-24

## 2017-05-02 ENCOUNTER — MYC MEDICAL ADVICE (OUTPATIENT)
Dept: FAMILY MEDICINE | Facility: CLINIC | Age: 72
End: 2017-05-02

## 2017-05-03 NOTE — TELEPHONE ENCOUNTER
Reason for call: Elva from home care is calling to report the ulcer on Patients elbow and is asking for orders for Mepilex dressing for the wound care. Patient is having difficulties getting around in general and has Home care for PT and OT.    Phone Number Pt can be reached at: Other phone number:  [phone]  Best Time: anytime  Can we leave a detailed message on this number? YES

## 2017-05-04 NOTE — TELEPHONE ENCOUNTER
Nurse called and left a detailed voicemail on a secure line for Elva ( nurse).  Verbal order was given for the orders requested below.  For Elva to call the clinic back at [phone] with any further questions or concerns.     Josh TUCKER, RN, BSN

## 2017-05-09 ENCOUNTER — RADIANT APPOINTMENT (OUTPATIENT)
Dept: GENERAL RADIOLOGY | Facility: CLINIC | Age: 72
End: 2017-05-09
Attending: FAMILY MEDICINE
Payer: COMMERCIAL

## 2017-05-09 ENCOUNTER — OFFICE VISIT (OUTPATIENT)
Dept: FAMILY MEDICINE | Facility: CLINIC | Age: 72
End: 2017-05-09
Payer: COMMERCIAL

## 2017-05-09 VITALS
SYSTOLIC BLOOD PRESSURE: 120 MMHG | HEART RATE: 80 BPM | OXYGEN SATURATION: 98 % | DIASTOLIC BLOOD PRESSURE: 78 MMHG | TEMPERATURE: 97.6 F

## 2017-05-09 DIAGNOSIS — L89.021: ICD-10-CM

## 2017-05-09 DIAGNOSIS — M48.061 SPINAL STENOSIS OF LUMBAR REGION: Primary | ICD-10-CM

## 2017-05-09 DIAGNOSIS — R26.9 GAIT DIFFICULTY: ICD-10-CM

## 2017-05-09 DIAGNOSIS — M32.9 SYSTEMIC LUPUS ERYTHEMATOSUS (H): ICD-10-CM

## 2017-05-09 DIAGNOSIS — M79.7 FIBROMYALGIA: ICD-10-CM

## 2017-05-09 DIAGNOSIS — M48.061 SPINAL STENOSIS OF LUMBAR REGION: ICD-10-CM

## 2017-05-09 DIAGNOSIS — M81.0 OSTEOPOROSIS: ICD-10-CM

## 2017-05-09 DIAGNOSIS — M25.551 HIP PAIN, RIGHT: ICD-10-CM

## 2017-05-09 DIAGNOSIS — Z79.899 HIGH RISK MEDICATION USE: ICD-10-CM

## 2017-05-09 DIAGNOSIS — M76.891 HIP ABDUCTOR TENDINITIS, RIGHT: ICD-10-CM

## 2017-05-09 PROCEDURE — 72100 X-RAY EXAM L-S SPINE 2/3 VWS: CPT

## 2017-05-09 PROCEDURE — 99214 OFFICE O/P EST MOD 30 MIN: CPT | Performed by: FAMILY MEDICINE

## 2017-05-09 RX ORDER — HYDROCODONE BITARTRATE AND ACETAMINOPHEN 5; 325 MG/1; MG/1
1-2 TABLET ORAL EVERY 4 HOURS PRN
Qty: 30 TABLET | Refills: 0 | Status: SHIPPED | OUTPATIENT
Start: 2017-05-09 | End: 2017-05-18

## 2017-05-09 NOTE — MR AVS SNAPSHOT
After Visit Summary   5/9/2017    Marlys A Tietz    MRN: [id number]           Patient Information     Date Of Birth          1945        Visit Information        Provider Department      5/9/2017 5:20 PM Ella Rodriguez MD [facility]        Today's Diagnoses     Spinal stenosis of lumbar region    -  1    Hip pain, right        Fibromyalgia        Gait difficulty          Care Instructions    [facility]    If you have any questions regarding to your visit please contact your care team:       Team Red:   Clinic Hours Telephone Number   Dr. Flora Martines, NP   7am-7pm  Monday - Thursday   7am-5pm  Fridays  [phone]  (Appointment scheduling available 24/7)    Questions about your visit?   Team Line  [phone]   Urgent Care - [place] and [facility] - 11am-9pm Monday-Friday Saturday-Sunday- 9am-5pm   [place] - 5pm-9pm Monday-Friday Saturday-Sunday- 9am-5pm  [phone] - [facility]  [phone] - [place]       What options do I have for visits at the clinic other than the traditional office visit?  To expand how we care for you, many of our providers are utilizing electronic visits (e-visits) and telephone visits, when medically appropriate, for interactions with their patients rather than a visit in the clinic.   We also offer nurse visits for many medical concerns. Just like any other service, we will bill your insurance company for this type of visit based on time spent on the phone with your provider. Not all insurance companies cover these visits. Please check with your medical insurance if this type of visit is covered. You will be responsible for any charges that are not paid by your insurance.      E-visits via [employer]:  generally incur a $35.00 fee.  Telephone visits:  Time spent on the phone: *charged based on time that is spent on the phone in increments of 10 minutes. Estimated cost:    5-10 mins $30.00   11-20 mins. $59.00   21-30 mins. $85.00     Use [employer] (secure email communication and access to your chart) to send your primary care provider a message or make an appointment. Ask someone on your Team how to sign up for [employer].  For a Price Quote for your services, please call our NICE Line at [phone].      As always, Thank you for trusting us with your health care needs!  Discharged by Kaitlynn Bhatt MA.          Follow-ups after your visit        Additional Services     ORTHOPEDICS ADULT REFERRAL       Your provider has referred you to: [facility]: [facility] [phone]   [email or web address]    Please be aware that coverage of these services is subject to the terms and limitations of your health insurance plan.  Call member services at your health plan with any benefit or coverage questions.      Please bring the following to your appointment:    >>   Any x-rays, CTs or MRIs which have been performed.  Contact the facility where they were done to arrange for  prior to your scheduled appointment.  Any new CT, MRI or other procedures ordered by your specialist must be performed at a [place] facility or coordinated by your clinic's referral office.    >>   List of current medications   >>   This referral request   >>   Any documents/labs given to you for this referral                  Future tests that were ordered for you today     Open Future Orders        Priority Expected Expires Ordered    XR Lumbar Spine 2/3 Views Routine 5/9/2017 5/9/2018 5/9/2017    XR Pelvis w Hip Right G/E 2 Views Routine 5/9/2017 5/9/2018 5/9/2017            Who to contact     If you have questions or need follow up information about today's clinic visit or your schedule please contact [facility] directly at [phone].  Normal or non-critical lab and imaging results will be communicated to you by MyChart, letter or phone within 4  business days after the clinic has received the results. If you do not hear from us within 7 days, please contact the clinic through [employer] or phone. If you have a critical or abnormal lab result, we will notify you by phone as soon as possible.  Submit refill requests through [employer] or call your pharmacy and they will forward the refill request to us. Please allow 3 business days for your refill to be completed.          Additional Information About Your Visit        [employer] Information     [employer] gives you secure access to your electronic health record. If you see a primary care provider, you can also send messages to your care team and make appointments. If you have questions, please call your primary care clinic.  If you do not have a primary care provider, please call [phone] and they will assist you.        Care EveryWhere ID     This is your Care EveryWhere ID. This could be used by other organizations to access your [place] medical records  [id number]        Your Vitals Were     Pulse Temperature Pulse Oximetry             80 97.6  F (36.4  C) (Oral) 98%          Blood Pressure from Last 3 Encounters:   05/09/17 120/78   03/03/17 129/75   02/19/17 144/90    Weight from Last 3 Encounters:   03/03/17 139 lb (63 kg)   02/19/17 141 lb 4.8 oz (64.1 kg)   01/03/17 139 lb (63 kg)              We Performed the Following     ORTHOPEDICS ADULT REFERRAL          Today's Medication Changes          These changes are accurate as of: 5/9/17  6:41 PM.  If you have any questions, ask your nurse or doctor.               Start taking these medicines.        Dose/Directions    diclofenac 1 % Gel topical gel   Commonly known as:  VOLTAREN   Used for:  Fibromyalgia, Gait difficulty, Hip pain, right, Spinal stenosis of lumbar region   Started by:  Ella Rodriguez MD        Apply 4 grams to knees or 2 grams to hands four times daily using enclosed dosing card.   Quantity:  100 g   Refills:  1        HYDROcodone-acetaminophen 5-325 MG per tablet   Commonly known as:  NORCO   Used for:  Hip pain, right, Spinal stenosis of lumbar region, Fibromyalgia   Started by:  Ella Rodriguez MD        Dose:  1-2 tablet   Take 1-2 tablets by mouth every 4 hours as needed for moderate to severe pain maximum 2 tablet(s) per day   Quantity:  30 tablet   Refills:  0            Where to get your medicines      These medications were sent to [id number] - [place] [place] [address]  [address] [place] [address]     Phone:  [phone]     diclofenac 1 % Gel topical gel         Some of these will need a paper prescription and others can be bought over the counter.  Ask your nurse if you have questions.     Bring a paper prescription for each of these medications     HYDROcodone-acetaminophen 5-325 MG per tablet                Primary Care Provider Office Phone # Fax #    Ella Rodriguez [phone]       [facility] [address]  [place] [address]        Thank you!     Thank you for choosing [facility]  for your care. Our goal is always to provide you with excellent care. Hearing back from our patients is one way we can continue to improve our services. Please take a few minutes to complete the written survey that you may receive in the mail after your visit with us. Thank you!             Your Updated Medication List - Protect others around you: Learn how to safely use, store and throw away your medicines at www.disposemymeds.org.          This list is accurate as of: 5/9/17  6:42 PM.  Always use your most recent med list.                   Brand Name Dispense Instructions for use    aspirin 81 MG tablet      Take  by mouth daily.       atorvastatin 20 MG tablet    LIPITOR    30 tablet    TAKE ONE TABLET BY MOUTH ONCE DAILY -       azaTHIOprine 50 MG tablet    IMURAN     Take 50 mg by mouth daily.       BONIVA 3 MG/3ML   Generic drug:   ibandronate      Inject 3 mg into the vein once       budesonide 3 MG 24 hr capsule    ENTOCORT EC     Take 3 mg by mouth 2 times daily.       CALTRATE 600+D PO      TWO TAB DAILY       cephALEXin 500 MG capsule    KEFLEX    4 capsule    2 grams  Half hour before Procedure       diclofenac 1 % Gel topical gel    VOLTAREN    100 g    Apply 4 grams to knees or 2 grams to hands four times daily using enclosed dosing card.       HYDROcodone-acetaminophen 5-325 MG per tablet    NORCO    30 tablet    Take 1-2 tablets by mouth every 4 hours as needed for moderate to severe pain maximum 2 tablet(s) per day       lisinopril 5 MG tablet    PRINIVIL/ZESTRIL    90 tablet    Take 1 tablet (5 mg) by mouth daily       MULTIVITAMIN TABS   OR      1 TABLET DAILY       predniSONE 2.5 MG tablet    DELTASONE     Take 5 mg by mouth daily 4 for the first week 20mg,  3.5 the next 17.5mg, 3 the next 15mg, 2.5 the next 12.5mg, 2 the next 10mg, 1.5 the next 7.5mg, finally ending with 1 5mg       priLOSEC 20 MG CR capsule   Generic drug:  omeprazole      1 CAPSULE DAILY       traMADol 50 MG tablet    ULTRAM         tretinoin 0.1 % cream    RETIN-A         TYLENOL EXTRA STRENGTH 500 MG Tabs      1 TABLET EVERY 4 HOURS AS NEEDED

## 2017-05-09 NOTE — NURSING NOTE
"Chief Complaint   Patient presents with     Other     crackling noise in lungs per homecare nurse     RECHECK     Back Pain     Sore     check pressure sore on left elbow       Initial /78 (BP Location: Left arm, Patient Position: Chair, Cuff Size: Adult Regular)  Pulse 80  Temp 97.6  F (36.4  C) (Oral)  SpO2 98% Estimated body mass index is 24.24 kg/(m^2) as calculated from the following:    Height as of 3/3/17: 5' 3.5\" (1.613 m).    Weight as of 3/3/17: 139 lb (63 kg).  Medication Reconciliation: complete     unable to obtain weight and height    "

## 2017-05-09 NOTE — PROGRESS NOTES
SUBJECTIVE:                                                    Marlys A Tietz is a 72 year old female who presents to clinic today for the following health issues:      Chief Complaint   Patient presents with     Other     crackling noise in lungs per homecare nurse     RECHECK     Back Pain     Sore     check pressure sore on left elbow     Pt says she has Chronic pain Issues  She has  been Seeing Rheumatology and they have decreased her Prednisone to 12.5 daily from 20 mg-This is for her SLE  Her pain has Increased  Her Rheumatologist is Giving her Ultram  Pt has Low back pain and Right Hip pain  She had Xrays done at Rheumatology and she has spinal stenosis  She fell  Easter weekend and her pain has Increased  She has Not had Xrays done since Then  Pt also has Right Hip pain  She uses walker at Home  Her  says she is in bed Most of the Time  Pt has Home PT  No radiation  Pertinent negatives include no fever, no numbness, no abdominal pain, no abdominal swelling, no bowel incontinence, no perianal numbness, no bladder incontinence, no dysuria, no leg pain, no paresthesias, no paresis, no tingling and no weakness.    Pt also has a Pressure sore Left elbow  That Home care is taking care of            Problem list and histories reviewed & adjusted, as indicated.  Additional history: as documented    Patient Active Problem List   Diagnosis     HYPERLIPIDEMIA LDL GOAL <130     Vasculitis (H)     Fibromyalgia     Osteoporosis     Osteoarthritis     Systemic lupus erythematosus (H)     Advanced directives, counseling/discussion     Lymphocytic colitis     Hypertension goal BP (blood pressure) < 140/90     Impaired glucose tolerance     Diverticulitis     Diverticulitis of colon     Tear of right rotator cuff     Tear of left rotator cuff     Hip abductor tear     H/O bacterial endocarditis     H/O endocarditis     Gait difficulty     Loss of balance     Shoulder pain, bilateral     High risk medication use      Past Surgical History:   Procedure Laterality Date     NO HISTORY OF SURGERY         Social History   Substance Use Topics     Smoking status: Never Smoker     Smokeless tobacco: Never Used     Alcohol use 0.5 oz/week     1 Standard drinks or equivalent per week     Family History   Problem Relation Age of Onset     Hypertension Mother      OSTEOPOROSIS Mother          Current Outpatient Prescriptions   Medication Sig Dispense Refill     diclofenac (VOLTAREN) 1 % GEL topical gel Apply 4 grams to knees or 2 grams to hands four times daily using enclosed dosing card. 100 g 1     HYDROcodone-acetaminophen (NORCO) 5-325 MG per tablet Take 1-2 tablets by mouth every 4 hours as needed for moderate to severe pain maximum 2 tablet(s) per day 30 tablet 0     traMADol (ULTRAM) 50 MG tablet        tretinoin (RETIN-A) 0.1 % cream        atorvastatin (LIPITOR) 20 MG tablet TAKE ONE TABLET BY MOUTH ONCE DAILY - 30 tablet 10     cephALEXin (KEFLEX) 500 MG capsule 2 grams  Half hour before Procedure 4 capsule 3     lisinopril (PRINIVIL,ZESTRIL) 5 MG tablet Take 1 tablet (5 mg) by mouth daily 90 tablet 3     ibandronate (BONIVA) 3 MG/3ML Inject 3 mg into the vein once       budesonide (ENTOCORT EC) 3 MG 24 hr capsule Take 3 mg by mouth 2 times daily.       predniSONE (DELTASONE) 2.5 MG tablet Take 5 mg by mouth daily 4 for the first week 20mg,  3.5 the next 17.5mg, 3 the next 15mg, 2.5 the next 12.5mg, 2 the next 10mg, 1.5 the next 7.5mg, finally ending with 1 5mg       azathioprine (IMURAN) 50 MG tablet Take 50 mg by mouth daily.       CALTRATE 600+D OR TWO TAB DAILY       PRILOSEC 20 MG OR CPDR 1 CAPSULE DAILY       aspirin 81 MG tablet Take  by mouth daily.       MULTIVITAMIN TABS   OR 1 TABLET DAILY       TYLENOL EXTRA STRENGTH 500 MG OR TABS 1 TABLET EVERY 4 HOURS AS NEEDED       Allergies   Allergen Reactions     Penicillins      Reaction unknown     Sulfa Drugs      Was told by mother     Recent Labs   Lab Test  01/03/17    1115  09/23/16   1156 08/18/15 08/15/15  07/22/15   1650  02/18/15   1036   02/20/14   0942   A1C  5.9   --    --    --    --    --    --    --    LDL  69  102*   --    --    --   76   --   95   HDL  104  86   --    --    --   102   --   97   TRIG  185*  167*   --    --    --   183*   --   98   ALT  32   --    --   20  25  30   --   29   CR   --   0.79  0.8  1.0  0.87  0.76   < >  0.79   GFRESTIMATED   --   72   --   58*  64  76   < >  72   GFRESTBLACK   --   87   --   >60  77  >90   GFR Calc     < >  87   POTASSIUM   --   4.4  4.6  4.4  4.5  4.3   < >  3.9   TSH   --    --    --    --    --    --    --   1.82    < > = values in this interval not displayed.      BP Readings from Last 3 Encounters:   05/09/17 120/78   03/03/17 129/75   02/19/17 144/90    Wt Readings from Last 3 Encounters:   03/03/17 139 lb (63 kg)   02/19/17 141 lb 4.8 oz (64.1 kg)   01/03/17 139 lb (63 kg)                  Labs reviewed in EPIC    Reviewed and updated as needed this visit by clinical staff  Tobacco  Allergies  Meds  Problems  Med Hx  Surg Hx  Fam Hx  Soc Hx        Reviewed and updated as needed this visit by Provider  Problems         ROS:  C: NEGATIVE for fever, chills, change in weight  E/M: NEGATIVE for ear, mouth and throat problems  R: NEGATIVE for significant cough or SOB  CV: NEGATIVE for chest pain, palpitations or peripheral edema  GI: NEGATIVE for nausea, abdominal pain, heartburn, or change in bowel habits  MUSCULOSKELETAL: as above  ROS otherwise negative    OBJECTIVE:                                                    /78 (BP Location: Left arm, Patient Position: Chair, Cuff Size: Adult Regular)  Pulse 80  Temp 97.6  F (36.4  C) (Oral)  SpO2 98%  There is no height or weight on file to calculate BMI.  GENERAL: frail, elderly and in a wheelchair  NECK: no adenopathy, no asymmetry, masses, or scars and thyroid normal to palpation  RESP: lungs clear to auscultation - no rales, rhonchi or  wheezes  CV: regular rate and rhythm, normal S1 S2, no S3 or S4, no murmur, click or rub, no peripheral edema and peripheral pulses strong  ABDOMEN: soft, nontender, no hepatosplenomegaly, no masses and bowel sounds normal  MS: no gross musculoskeletal defects noted, no edema  Mild tenderness Lower back  Unable to have her stand as Pt Moves slowly  She says she walks with a walker at Home  Right hip no tenderness   Diagnostic Test Results:  Pending   Xray reviewed      ASSESSMENT/PLAN:                                                          ICD-10-CM    1. Spinal stenosis of lumbar region M48.06 XR Lumbar Spine 2/3 Views     diclofenac (VOLTAREN) 1 % GEL topical gel     HYDROcodone-acetaminophen (NORCO) 5-325 MG per tablet     ORTHOPEDICS ADULT REFERRAL   2. Hip pain, right M25.551 XR Pelvis w Hip Right G/E 2 Views     diclofenac (VOLTAREN) 1 % GEL topical gel     HYDROcodone-acetaminophen (NORCO) 5-325 MG per tablet     ORTHOPEDICS ADULT REFERRAL   3. Gait difficulty R26.9 diclofenac (VOLTAREN) 1 % GEL topical gel     ORTHOPEDICS ADULT REFERRAL   4. Systemic lupus erythematosus (H) M32.9    5. High risk medication use Z79.899    6. Fibromyalgia M79.7 diclofenac (VOLTAREN) 1 % GEL topical gel     HYDROcodone-acetaminophen (NORCO) 5-325 MG per tablet   7. Pressure sore on elbow, left, stage I L89.021    8. Hip abductor tear M76.891    9. Osteoporosis M81.0    SEE EPIC care orders  The potential side effects of this medication have been discussed with the patient.  Call if any significant problems with these are experienced.  She will use Ultram or Vicodin  Use walker at all Times  Continue PT  Refer Orthopedics  Transfer Records From Rheumatology  Follow up with her Rheumatologist as her pain has Increased since Prednisone was decreased  Ella Rodriguez MD  [facility]

## 2017-05-09 NOTE — PATIENT INSTRUCTIONS
[facility]    If you have any questions regarding to your visit please contact your care team:       Team Red:   Clinic Hours Telephone Number   Dr. Flora Martines, NP   7am-7pm  Monday - Thursday   7am-5pm  Fridays  [phone]  (Appointment scheduling available 24/7)    Questions about your visit?   Team Line  [phone]   [facility] [place] [facility] 11am-[facility]pm Monday-Friday Saturday-Sunday- 9am-5pm   [place] - 5pm-9pm Monday-Friday Saturday-Sunday- 9am-5pm  [phone] - [place]   [phone] - [place]       What options do I have for visits at the clinic other than the traditional office visit?  To expand how we care for you, many of our providers are utilizing electronic visits (e-visits) and telephone visits, when medically appropriate, for interactions with their patients rather than a visit in the clinic.   We also offer nurse visits for many medical concerns. Just like any other service, we will bill your insurance company for this type of visit based on time spent on the phone with your provider. Not all insurance companies cover these visits. Please check with your medical insurance if this type of visit is covered. You will be responsible for any charges that are not paid by your insurance.      E-visits via [employer]:  generally incur a $35.00 fee.  Telephone visits:  Time spent on the phone: *charged based on time that is spent on the phone in increments of 10 minutes. Estimated cost:   5-10 mins $30.00   11-20 mins. $59.00   21-30 mins. $85.00     Use [employer] (secure email communication and access to your chart) to send your primary care provider a message or make an appointment. Ask someone on your Team how to sign up for [employer].  For a Price Quote for your services, please call our Consumer Price Line at [phone].      As always, Thank you for trusting us with your health care needs!  Discharged  by Kaitlynn Bhatt MA.

## 2017-05-10 ENCOUNTER — TELEPHONE (OUTPATIENT)
Dept: FAMILY MEDICINE | Facility: CLINIC | Age: 72
End: 2017-05-10

## 2017-05-10 NOTE — TELEPHONE ENCOUNTER
Received fax from pharmacy stating patient requires Prior Authorization for Voltaren    Insurance information:  Name:   Phone number: [phone]  ID number: [id number]    Provider to address. Message route to Dr. Rodriguez. Initiate prior authorization or change medication?  Please advise.      **If a prior authorization is to be initiated, please list the following:    -Any medications the patient has tried and failed or any contraindications. **    -Is the patient currently on this medication, or has tried before? **    -What is the diagnosis? **    - Justification or other information that me by helpful. **

## 2017-05-12 NOTE — TELEPHONE ENCOUNTER
Schedule MRI at [place] office  Please call [facility] at [phone] to schedule your  Imaging appointment   Advised to consider Vertebroplasty  Referral done to Radiology

## 2017-05-15 ENCOUNTER — RADIANT APPOINTMENT (OUTPATIENT)
Dept: MRI IMAGING | Facility: CLINIC | Age: 72
End: 2017-05-15
Attending: FAMILY MEDICINE
Payer: COMMERCIAL

## 2017-05-15 ENCOUNTER — TELEPHONE (OUTPATIENT)
Dept: FAMILY MEDICINE | Facility: CLINIC | Age: 72
End: 2017-05-15

## 2017-05-15 PROCEDURE — 72148 MRI LUMBAR SPINE W/O DYE: CPT | Mod: TC

## 2017-05-15 NOTE — TELEPHONE ENCOUNTER
Reason for Call: Request for an order or referral:    Order or referral being requested: Referral    Date needed: as soon as possible    Has the patient been seen by the PCP for this problem? YES    Additional comments: [facility] . Please call Will .      Phone number Patient can be reached at:  Home number on file [phone] (home)    Best Time:  any    Can we leave a detailed message on this number?  YES    Call taken on 5/15/2017 at 3:31 PM by Kelly Yee

## 2017-05-15 NOTE — TELEPHONE ENCOUNTER
Confirmed fax of Radiology Orders to  [phone]. They will contact [facility] to schedule. Jia Mejia,     Left a message that the request of faxing the orders has been completed and they should be contacting them to schedule.

## 2017-05-17 NOTE — TELEPHONE ENCOUNTER
RN notified patient of the provider's message as it's written below.  Patient agrees and verbalized understanding.     Josh TUCKER RN, BSN

## 2017-05-17 NOTE — TELEPHONE ENCOUNTER
Spoke with patient. Patient had MRI 5-16-17. [facility] Ghada KATZ MA  Patient wants to know if she can take more then 2 Vicodin daily. Please advise.  Ghada KATZ MA

## 2017-05-18 ENCOUNTER — TELEPHONE (OUTPATIENT)
Dept: FAMILY MEDICINE | Facility: CLINIC | Age: 72
End: 2017-05-18

## 2017-05-18 DIAGNOSIS — M25.551 HIP PAIN, RIGHT: ICD-10-CM

## 2017-05-18 DIAGNOSIS — R26.9 GAIT DIFFICULTY: ICD-10-CM

## 2017-05-18 DIAGNOSIS — M48.061 SPINAL STENOSIS OF LUMBAR REGION: ICD-10-CM

## 2017-05-18 DIAGNOSIS — M79.7 FIBROMYALGIA: ICD-10-CM

## 2017-05-18 RX ORDER — HYDROCODONE BITARTRATE AND ACETAMINOPHEN 5; 325 MG/1; MG/1
1-2 TABLET ORAL EVERY 4 HOURS PRN
Qty: 30 TABLET | Refills: 0 | Status: SHIPPED | OUTPATIENT
Start: 2017-05-18 | End: 2017-06-02

## 2017-05-18 NOTE — TELEPHONE ENCOUNTER
Reason for Call:  Other prescription    Detailed comments:  Patient calling. She needs a refill of norco. Please call when ready to .     Phone Number Patient can be reached at: Home number on file [phone] (home)    Best Time: any    Can we leave a detailed message on this number? YES    Call taken on 5/18/2017 at 12:12 PM by Janie Medina

## 2017-05-18 NOTE — TELEPHONE ENCOUNTER
Controlled Substance Refill Request for HYDROcodone-acetaminophen (NORCO) 5-325 MG per tablet  Problem List Complete:  No     PROVIDER TO CONSIDER COMPLETION OF PROBLEM LIST AND OVERVIEW/CONTROLLED SUBSTANCE AGREEMENT    Last Written Prescription Date:  5/9/2017  Last Fill Quantity: 30,   # refills: 0    [facility] Office Visit with [facility] care provider: 5/9/2017    Future Office visit:     Controlled substance agreement on file: No.     Processing:  Patient will  in clinic   checked in past 6 months?  No, route to RN       diclofenac (VOLTAREN) 1 % GEL topical gel 100 g 1 5/9/2017  --   Sig: Apply 4 grams to knees or 2 grams to hands four times daily using enclosed dosing card.   Class: E-Prescribe     Was sent to: [facility]  [address]

## 2017-05-18 NOTE — TELEPHONE ENCOUNTER
Received fax from pharmacy stating patient requires Prior Authorization for Voltaren 1% GEL    Insurance information:  Name:   Phone number: [phone]  ID number: [id number]    Provider to address. Message route to Dr. Rodriguez. Initiate prior authorization or change medication?  Please advise.  Karishma Carr MA      **If a prior authorization is to be initiated, please list the following:    -Any medications the patient has tried and failed or any contraindications. **    -Is the patient currently on this medication, or has tried before? **    -What is the diagnosis? **    - Justification or other information that me by helpful. **

## 2017-05-18 NOTE — TELEPHONE ENCOUNTER
Called and spoke with Will. He will  the script for HYDROcodone-acetaminophen (NORCO) 5-325 MG per tablet.    Paper prescription is down at [facility]  ready for . Jia Mejia,     Informed that the diclofenac (VOLTAREN) 1 % GEL topical gel precription was at the pharmacy. He understood and understood it may not be covered by insurance. They may pay out of pocket for this.    [facility] [address] [place] [address] [address] [place] [address]

## 2017-05-18 NOTE — TELEPHONE ENCOUNTER
Reason for Call:  Other prescription    Detailed comments:  Also needed is a rx of voltaren. Please call into pharmacy.     Phone Number Patient can be reached at: Home number on file [phone] (home)    Best Time: any    Can we leave a detailed message on this number? YES    Call taken on 5/18/2017 at 12:17 PM by Janie Medina

## 2017-05-18 NOTE — TELEPHONE ENCOUNTER
Will called to check status of the refill request below, states patient has only 2 doses left . Please call above number.

## 2017-05-19 ENCOUNTER — TELEPHONE (OUTPATIENT)
Dept: FAMILY MEDICINE | Facility: CLINIC | Age: 72
End: 2017-05-19

## 2017-05-19 NOTE — TELEPHONE ENCOUNTER
Reason for Call:  Other prescription    Detailed comments:  Patient would like a return call to discuss or to clarify the directions for the prescription Norco, please contact the patient to discuss further    Phone Number Patient can be reached at: Home number on file [phone] (home)    Best Time: today    Can we leave a detailed message on this number? YES    Call taken on 5/19/2017 at 1:02 PM by Den Garrido

## 2017-05-19 NOTE — TELEPHONE ENCOUNTER
Reason for Call:  Other prescription    Detailed comments: Patient would like to clarify also the directions for the script Voltaren please call to discuss further    Phone Number Patient can be reached at: Home number on file [phone] (home)    Best Time: today    Can we leave a detailed message on this number? YES    Call taken on 5/19/2017 at 1:04 PM by Den Garrido

## 2017-05-19 NOTE — TELEPHONE ENCOUNTER
Pt was given #30 tabs  No documentation about how long medication is supposed to last her  If takes 2 tabs per day, will last 15 days  I cannot comment on when Dr. Rodriguez will refill the medication    She is able to take up to 4 tabs per day, but I do not know when she is able to next fill the medication-- this is not in her record  Kriss Frederick MD

## 2017-05-19 NOTE — TELEPHONE ENCOUNTER
Will have to hold [place] prescription for Dr. Rodriguez. Until then, only 2 pills per day  For voltaren, can use 4 g to back up to 4 times daily  Kriss Frederick MD

## 2017-05-19 NOTE — TELEPHONE ENCOUNTER
See telephone encounter 5/10/17 per Dr. Rodriguez ok for patient to take more than 2 Norco per day.  Pt states the direction doesn't make sense as it contraindicate itself because it states to take 1-2 tablets every 4 hours PRN but then it mention maximum of 2 tablets per day?   Pt would like to know which direction should she follow?    Pt also has question about Voltaren, this should have been for her back because she has no problem on her knee or hand.  Initially this Rx should have been for her back and was prescribed for that but the direction is incorrect.  Please review and advise on both medication and it's direction.    Josh TUCKER, RN, BSN

## 2017-05-19 NOTE — TELEPHONE ENCOUNTER
"RN notified patient and patient's spouse of the provider's message as it's written below.  Patient and patient's spouse not happy about the message below, states \"it's what it's\" and they will just have to deal with it for now.  No further action needed.     Josh TUCKER RN, BSN    "

## 2017-05-19 NOTE — TELEPHONE ENCOUNTER
RN notified patient of the provider's message as it's written below.  Patient states there was a record that she called last week and was informed she can take more than 2 tables per day and it was for the same Rx.  Patient and patient's spouse is not understanding the reasoning behind the provider recommendation to take only 2 pills per day.  Pt would like for the provider in the clinic to see the record in her chart when she called to clarify on the direction on [place] on 5/10/17 and was told she can take more than 2 tablets per day.  Patient was upset and is not accepting the recommendation given below to take 2 pills of Norco per day since she was told last week ok to take more than 2 tablets  Will re-route back to the covering team to advise as writer is not sure what to tell patient at this point.    Josh TUCKER RN, BSN

## 2017-05-23 ENCOUNTER — TRANSFERRED RECORDS (OUTPATIENT)
Dept: HEALTH INFORMATION MANAGEMENT | Facility: CLINIC | Age: 72
End: 2017-05-23

## 2017-05-30 NOTE — PROGRESS NOTES
Subjective:    HPI                    Objective:    System    Physical Exam    General     ROS    Assessment/Plan:      DISCHARGE REPORT    Progress reporting period is from 11.10.16 to 2.3.17.     SUBJECTIVE  Subjective: same old issues flaring up, trouble walking heavy feet, shoulders are very painful    Current Pain level: 7/10   Initial Pain level: 5/10   Changes in function: Yes, see goal flow sheet for change in function   Adverse reactions: None;   ,     The subjective and objective information are from the last SOAP note on this patient.    OBJECTIVE  Objective: pt amb's unlimited w/ SC, indep in exs after review today. She presents today bothered by her pain but is wiling to continue on her own w/ HEP. She demos functional AROM of UEs overhead bilat       ASSESSMENT/PLAN  Updated problem list and treatment plan: Diagnosis 1:  Balance/gait     STG/LTGs have been met or progress has been made towards goals:  Yes (See Goal flow sheet completed today.)  Assessment of Progress: Patient is meeting short term goals and is progressing towards long term goals.  Self Management Plans:  Patient is independent in a home treatment program.  Patient is independent in self management of symptoms.    Oanh continues to require the following intervention to meet STG and LTG's: PT intervention is no longer required to meet STG/LTG.  We will discharge this patient from PT.    Recommendations:  This patient is ready to be discharged from therapy and continue their home treatment program.    Please refer to the daily flowsheet for treatment today, total treatment time and time spent performing 1:1 timed codes.

## 2017-05-31 ENCOUNTER — MEDICAL CORRESPONDENCE (OUTPATIENT)
Dept: HEALTH INFORMATION MANAGEMENT | Facility: CLINIC | Age: 72
End: 2017-05-31

## 2017-05-31 ENCOUNTER — TELEPHONE (OUTPATIENT)
Dept: FAMILY MEDICINE | Facility: CLINIC | Age: 72
End: 2017-05-31

## 2017-05-31 NOTE — TELEPHONE ENCOUNTER
Controlled Substance Refill Request for HYDROcodone-acetaminophen (NORCO) 5-325 MG per tablet  Problem List Complete:  No     PROVIDER TO CONSIDER COMPLETION OF PROBLEM LIST AND OVERVIEW/CONTROLLED SUBSTANCE AGREEMENT    Last Written Prescription Date:  5/18/2017  Last Fill Quantity: 30,   # refills: 0    Last Office Visit with [facility] primary care provider: 5/9/2017    Future Office visit:     Controlled substance agreement on file: No.     Processing:  Patient will  in clinic   checked in past 6 months?  No, route to RN

## 2017-05-31 NOTE — TELEPHONE ENCOUNTER
Reason for call:  Other   Patient called regarding (reason for call): prescription  Additional comments: Patient requesting medication refill for Hydrocodone.     Phone number to reach patient:  Home number on file [phone] (home)    Best Time:  anytime    Can we leave a detailed message on this number?  YES

## 2017-05-31 NOTE — TELEPHONE ENCOUNTER
[place] reviewed:  reviewed- no concerns  Print out  report is with writer. If you would like to see the printed report please see writer.    [place]:  [email or web address]    Josh TUCKER RN, BSN

## 2017-06-01 NOTE — PROGRESS NOTES
SUBJECTIVE:                                                    Marlys A Tietz is a 72 year old female who presents to clinic today for the following health issues:        Patient presents with:  Recheck Medication: Discuss med that was written wrong/refills.    Patient is here for a refill of norco.  She suffered a compression fracture of L1 in the middle of April and is awaiting a consult for vertebroplasty.  She has been taking norco for pain with some improvement.  There has been some confusion as to her dose and that is what she is here to discuss today.  She was initially prescribed medication to take 1-2 every 4 hrs prn with a max of 2 pills per day.  She then contacted her PCP and was told she could take more than 2 pills per day.  She was doing well on 3-4 pills per day, but then her prescription began to run low and her PCP was out of office.  She was instructed to follow-up for refills.  It is difficult for patient to get to clinic and she would like script clarified so that she can have 3-4 pills per day.  She denies any side effects, including confusion, constipation, unsteady gait.  Her  monitors and administers medication.  She has also been taking acetaminophen between doses as well.    Problem list and histories reviewed & adjusted, as indicated.  Additional history: as documented    Patient Active Problem List   Diagnosis     HYPERLIPIDEMIA LDL GOAL <130     Vasculitis (H)     Fibromyalgia     Osteoporosis     Osteoarthritis     Systemic lupus erythematosus (H)     Advanced directives, counseling/discussion     Lymphocytic colitis     Hypertension goal BP (blood pressure) < 140/90     Impaired glucose tolerance     Diverticulitis     Diverticulitis of colon     Tear of right rotator cuff     Tear of left rotator cuff     Hip abductor tear     H/O bacterial endocarditis     H/O endocarditis     High risk medication use     Past Surgical History:   Procedure Laterality Date     NO HISTORY OF  SURGERY         Social History   Substance Use Topics     Smoking status: Never Smoker     Smokeless tobacco: Never Used     Alcohol use 0.5 oz/week     1 Standard drinks or equivalent per week     Family History   Problem Relation Age of Onset     Hypertension Mother      OSTEOPOROSIS Mother          Current Outpatient Prescriptions   Medication Sig Dispense Refill     HYDROcodone-acetaminophen (NORCO) 5-325 MG per tablet Take 1 tablet by mouth every 6 hours as needed for moderate to severe pain maximum 4 tablet(s) per day 90 tablet 0     tretinoin (RETIN-A) 0.1 % cream        atorvastatin (LIPITOR) 20 MG tablet TAKE ONE TABLET BY MOUTH ONCE DAILY - 30 tablet 10     cephALEXin (KEFLEX) 500 MG capsule 2 grams  Half hour before Procedure 4 capsule 3     lisinopril (PRINIVIL,ZESTRIL) 5 MG tablet Take 1 tablet (5 mg) by mouth daily 90 tablet 3     ibandronate (BONIVA) 3 MG/3ML Inject 3 mg into the vein once       budesonide (ENTOCORT EC) 3 MG 24 hr capsule Take 3 mg by mouth 2 times daily.       predniSONE (DELTASONE) 2.5 MG tablet Take 5 mg by mouth daily 4 for the first week 20mg,  3.5 the next 17.5mg, 3 the next 15mg, 2.5 the next 12.5mg, 2 the next 10mg, 1.5 the next 7.5mg, finally ending with 1 5mg       azathioprine (IMURAN) 50 MG tablet Take 50 mg by mouth daily.       CALTRATE 600+D OR TWO TAB DAILY       PRILOSEC 20 MG OR CPDR 1 CAPSULE DAILY       aspirin 81 MG tablet Take  by mouth daily.       MULTIVITAMIN TABS   OR 1 TABLET DAILY       TYLENOL EXTRA STRENGTH 500 MG OR TABS 1 TABLET EVERY 4 HOURS AS NEEDED       Allergies   Allergen Reactions     Penicillins      Reaction unknown     Sulfa Drugs      Was told by mother     BP Readings from Last 3 Encounters:   06/02/17 109/70   05/09/17 120/78   03/03/17 129/75    Wt Readings from Last 3 Encounters:   03/03/17 139 lb (63 kg)   02/19/17 141 lb 4.8 oz (64.1 kg)   01/03/17 139 lb (63 kg)                  Labs reviewed in EPIC    Reviewed and updated as needed  this visit by clinical staff  Tobacco  Allergies  Meds  Med Hx  Surg Hx  Fam Hx  Soc Hx      Reviewed and updated as needed this visit by Provider         ROS:  Constitutional, HEENT, cardiovascular, pulmonary, gi and gu systems are negative, except as otherwise noted.    OBJECTIVE:                                                    /70 (BP Location: Right arm, Patient Position: Chair, Cuff Size: Adult Regular)  Pulse 97  Temp 97.1  F (36.2  C) (Oral)  SpO2 99%  There is no height or weight on file to calculate BMI.  GENERAL: healthy, alert and no distress  RESP: lungs clear to auscultation - no rales, rhonchi or wheezes  CV: regular rate and rhythm, normal S1 S2, no S3 or S4, no murmur, click or rub, no peripheral edema and peripheral pulses strong  MS: no gross musculoskeletal defects noted, no edema    Diagnostic Test Results:  none      ASSESSMENT/PLAN:                                                      1. Compression fracture of L1 lumbar vertebra, with routine healing, subsequent encounter  Patient to use sparingly.  See patient instructions.  Larger quantity given due to patient's difficulty getting to clinic.  Patient to follow-up with PCP for any additional refills.  - HYDROcodone-acetaminophen (NORCO) 5-325 MG per tablet; Take 1 tablet by mouth every 6 hours as needed for moderate to severe pain maximum 4 tablet(s) per day  Dispense: 90 tablet; Refill: 0    FUTURE APPOINTMENTS:       - Follow-up for annual visit or as needed    Patient Instructions     Do not take more than 3000 mg of tylenol per day (this equals to about 8 tabs of norco and tylenol total per day).    You may taking both a norco and tylenol at the same time if needed.  You can also alternate drugs.  Try to take as little norco as possible.  Only take 1 tablet of norco at a time.       [facility]    If you have any questions regarding to your visit please contact your care team:     Team Pink:   Clinic Hours  Telephone Number   Internal Medicine:  Dr. Maria Luisa Fisher, NP       7am-7pm  Monday - Thursday   7am-5pm  Fridays  [phone]  (Appointment scheduling available 24/7)    Questions about your visit?  Team Line  [phone]   Urgent Care - [place] and [facility] - 11am-9pm Monday-Friday Saturday-Sunday- 9am-5pm   [place] - 5pm-9pm Monday-Friday Saturday-Sunday- 9am-5pm  [phone] - [facility]  [phone] - [place]       What options do I have for visits at the clinic other than the traditional office visit?  To expand how we care for you, many of our providers are utilizing electronic visits (e-visits) and telephone visits, when medically appropriate, for interactions with their patients rather than a visit in the clinic.   We also offer nurse visits for many medical concerns. Just like any other service, we will bill your insurance company for this type of visit based on time spent on the phone with your provider. Not all insurance companies cover these visits. Please check with your medical insurance if this type of visit is covered. You will be responsible for any charges that are not paid by your insurance.      E-visits via [employer]:  generally incur a $35.00 fee.  Telephone visits:  Time spent on the phone: *charged based on time that is spent on the phone in increments of 10 minutes. Estimated cost:   5-10 mins $30.00   11-20 mins. $59.00   21-30 mins. $85.00   Use Uniihart (secure email communication and access to your chart) to send your primary care provider a message or make an appointment. Ask someone on your Team how to sign up for [employer].    For a Price Quote for your services, please call our Consumer Price Line at [phone].    As always, Thank you for trusting us with your health care needs!    Discharged by Pearl THOMAS CMA ([facility])        [facility]

## 2017-06-01 NOTE — TELEPHONE ENCOUNTER
RN notified patient of the provider's message as it's written below.  Patient wasn't happy about the message below because she has two pills left and it's not her fault how the Rx written and was advised in the past it's ok to take more than two tablets, but agrees and verbalized understanding.    Josh TUCKER RN, BSN

## 2017-06-02 ENCOUNTER — OFFICE VISIT (OUTPATIENT)
Dept: FAMILY MEDICINE | Facility: CLINIC | Age: 72
End: 2017-06-02
Payer: COMMERCIAL

## 2017-06-02 VITALS
DIASTOLIC BLOOD PRESSURE: 70 MMHG | TEMPERATURE: 97.1 F | OXYGEN SATURATION: 99 % | SYSTOLIC BLOOD PRESSURE: 109 MMHG | HEART RATE: 97 BPM

## 2017-06-02 DIAGNOSIS — S32.010D COMPRESSION FRACTURE OF L1 LUMBAR VERTEBRA, WITH ROUTINE HEALING, SUBSEQUENT ENCOUNTER: Primary | ICD-10-CM

## 2017-06-02 PROCEDURE — 99213 OFFICE O/P EST LOW 20 MIN: CPT | Performed by: NURSE PRACTITIONER

## 2017-06-02 RX ORDER — HYDROCODONE BITARTRATE AND ACETAMINOPHEN 5; 325 MG/1; MG/1
1 TABLET ORAL EVERY 6 HOURS PRN
Qty: 90 TABLET | Refills: 0 | Status: SHIPPED | OUTPATIENT
Start: 2017-06-02 | End: 2018-12-13

## 2017-06-02 NOTE — NURSING NOTE
"Chief Complaint   Patient presents with     Recheck Medication     Discuss med that was written wrong/refills.       Initial /70 (BP Location: Right arm, Patient Position: Chair, Cuff Size: Adult Regular)  Pulse 97  Temp 97.1  F (36.2  C) (Oral)  SpO2 99% Estimated body mass index is 24.24 kg/(m^2) as calculated from the following:    Height as of 3/3/17: 5' 3.5\" (1.613 m).    Weight as of 3/3/17: 139 lb (63 kg).  Medication Reconciliation: complete   Pearl THOMAS CMA ([facility])      "

## 2017-06-02 NOTE — MR AVS SNAPSHOT
After Visit Summary   6/2/2017    Marlys A Tietz    MRN: [id number]           Patient Information     Date Of Birth          1945        Visit Information        Provider Department      6/2/2017 3:40 PM Kaylah Fisher APRN [facility]        Today's Diagnoses     Compression fracture of L1 lumbar vertebra, with routine healing, subsequent encounter    -  1      Care Instructions    Do not take more than 3000 mg of tylenol per day (this equals to about 8 tabs of norco and tylenol total per day).    You may taking both a norco and tylenol at the same time if needed.  You can also alternate drugs.  Try to take as little norco as possible.  Only take 1 tablet of norco at a time.       [facility]    If you have any questions regarding to your visit please contact your care team:     Team Pink:   Clinic Hours Telephone Number   Internal Medicine:  Dr. Maria Luisa Fisher, NP       7am-7pm  Monday - Thursday   7am-5pm  Fridays  [phone]  (Appointment scheduling available 24/7)    Questions about your visit?  Team Line  [phone]   Urgent Care - [place] and [place] - 11am-9pm Monday-Friday Saturday-Sunday- 9am-5pm   [place] - 5pm-9pm Monday-Friday Saturday-Sunday- 9am-5pm  [phone] - [place]   [phone] - [place]       What options do I have for visits at the clinic other than the traditional office visit?  To expand how we care for you, many of our providers are utilizing electronic visits (e-visits) and telephone visits, when medically appropriate, for interactions with their patients rather than a visit in the clinic.   We also offer nurse visits for many medical concerns. Just like any other service, we will bill your insurance company for this type of visit based on time spent on the phone with your provider. Not all insurance companies cover these visits. Please check with your medical  insurance if this type of visit is covered. You will be responsible for any charges that are not paid by your insurance.      E-visits via [employer]:  generally incur a $35.00 fee.  Telephone visits:  Time spent on the phone: *charged based on time that is spent on the phone in increments of 10 minutes. Estimated cost:   5-10 mins $30.00   11-20 mins. $59.00   21-30 mins. $85.00   Use Retellityt (secure email communication and access to your chart) to send your primary care provider a message or make an appointment. Ask someone on your Team how to sign up for [employer]ell[employer]t.    For a Price Quote for your services, please call our [employer] Line at [phone].    As always, Thank you for trusting us with your health care needs!    Discharged by Pearl THOMAS CMA ([facility])            Follow-ups after your visit        Who to contact     If you have questions or need follow up information about today's clinic visit or your schedule please contact [facility] directly at [phone].  Normal or non-critical lab and imaging results will be communicated to you by [employer], letter or phone within 4 business days after the clinic has received the results. If you do not hear from us within 7 days, please contact the clinic through Retellityt or phone. If you have a critical or abnormal lab result, we will notify you by phone as soon as possible.  Submit refill requests through [employer] or call your pharmacy and they will forward the refill request to us. Please allow 3 business days for your refill to be completed.          Additional Information About Your Visit        [employer] Information     [employer] gives you secure access to your electronic health record. If you see a primary care provider, you can also send messages to your care team and make appointments. If you have questions, please call your primary care clinic.  If you do not have a primary care provider, please call [phone] and they will assist you.        Care  EveryWhere ID     This is your Care EveryWhere ID. This could be used by other organizations to access your [facility] medical records  [id number]        Your Vitals Were     Pulse Temperature Pulse Oximetry             97 97.1  F (36.2  C) (Oral) 99%          Blood Pressure from Last 3 Encounters:   06/02/17 109/70   05/09/17 120/78   03/03/17 129/75    Weight from Last 3 Encounters:   03/03/17 139 lb (63 kg)   02/19/17 141 lb 4.8 oz (64.1 kg)   01/03/17 139 lb (63 kg)              Today, you had the following     No orders found for display         Today's Medication Changes          These changes are accurate as of: 6/2/17  4:16 PM.  If you have any questions, ask your nurse or doctor.               These medicines have changed or have updated prescriptions.        Dose/Directions    HYDROcodone-acetaminophen 5-325 MG per tablet   Commonly known as:  NORCO   This may have changed:    - how much to take  - when to take this  - additional instructions   Used for:  Compression fracture of L1 lumbar vertebra, with routine healing, subsequent encounter   Changed by:  Kaylah Fisher, YEVGENIY CNP        Dose:  1 tablet   Take 1 tablet by mouth every 6 hours as needed for moderate to severe pain maximum 4 tablet(s) per day   Quantity:  90 tablet   Refills:  0            Where to get your medicines      Some of these will need a paper prescription and others can be bought over the counter.  Ask your nurse if you have questions.     Bring a paper prescription for each of these medications     HYDROcodone-acetaminophen 5-325 MG per tablet                Primary Care Provider Office Phone # Fax #    Ella Rodriguez [phone]       [facility] [address] [address]        Thank you!     Thank you for choosing [facility]  for your care. Our goal is always to provide you with excellent care. Hearing back from our patients is one way we can continue to improve  our services. Please take a few minutes to complete the written survey that you may receive in the mail after your visit with us. Thank you!             Your Updated Medication List - Protect others around you: Learn how to safely use, store and throw away your medicines at www.disposemymeds.org.          This list is accurate as of: 6/2/17  4:16 PM.  Always use your most recent med list.                   Brand Name Dispense Instructions for use    aspirin 81 MG tablet      Take  by mouth daily.       atorvastatin 20 MG tablet    LIPITOR    30 tablet    TAKE ONE TABLET BY MOUTH ONCE DAILY -       azaTHIOprine 50 MG tablet    IMURAN     Take 50 mg by mouth daily.       BONIVA 3 MG/3ML   Generic drug:  ibandronate      Inject 3 mg into the vein once       budesonide 3 MG 24 hr capsule    ENTOCORT EC     Take 3 mg by mouth 2 times daily.       CALTRATE 600+D PO      TWO TAB DAILY       cephALEXin 500 MG capsule    KEFLEX    4 capsule    2 grams  Half hour before Procedure       HYDROcodone-acetaminophen 5-325 MG per tablet    NORCO    90 tablet    Take 1 tablet by mouth every 6 hours as needed for moderate to severe pain maximum 4 tablet(s) per day       lisinopril 5 MG tablet    PRINIVIL/ZESTRIL    90 tablet    Take 1 tablet (5 mg) by mouth daily       MULTIVITAMIN TABS   OR      1 TABLET DAILY       predniSONE 2.5 MG tablet    DELTASONE     Take 5 mg by mouth daily 4 for the first week 20mg,  3.5 the next 17.5mg, 3 the next 15mg, 2.5 the next 12.5mg, 2 the next 10mg, 1.5 the next 7.5mg, finally ending with 1 5mg       priLOSEC 20 MG CR capsule   Generic drug:  omeprazole      1 CAPSULE DAILY       tretinoin 0.1 % cream    RETIN-A         TYLENOL EXTRA STRENGTH 500 MG Tabs      1 TABLET EVERY 4 HOURS AS NEEDED

## 2017-06-02 NOTE — PATIENT INSTRUCTIONS
Do not take more than 3000 mg of tylenol per day (this equals to about 8 tabs of norco and tylenol total per day).    You may taking both a norco and tylenol at the same time if needed.  You can also alternate drugs.  Try to take as little norco as possible.  Only take 1 tablet of norco at a time.       [facility]    If you have any questions regarding to your visit please contact your care team:     Team Pink:   Clinic Hours Telephone Number   Internal Medicine:  Dr. Maria Luisa Fisher, NP       7am-7pm  Monday - Thursday   7am-5pm  Fridays  [phone]  (Appointment scheduling available 24/7)    Questions about your visit?  Team Line  [phone]   Urgent Care - [place] [facility] - 11am-9pm Monday-Friday Saturday-Sunday- 9am-5pm   [place] - 5pm-9pm Monday-Friday Saturday-Sunday- 9am-5pm  [phone] - [facility]  [phone] - [place]       What options do I have for visits at the clinic other than the traditional office visit?  To expand how we care for you, many of our providers are utilizing electronic visits (e-visits) and telephone visits, when medically appropriate, for interactions with their patients rather than a visit in the clinic.   We also offer nurse visits for many medical concerns. Just like any other service, we will bill your insurance company for this type of visit based on time spent on the phone with your provider. Not all insurance companies cover these visits. Please check with your medical insurance if this type of visit is covered. You will be responsible for any charges that are not paid by your insurance.      E-visits via CoachSeek:  generally incur a $35.00 fee.  Telephone visits:  Time spent on the phone: *charged based on time that is spent on the phone in increments of 10 minutes. Estimated cost:   5-10 mins $30.00   11-20 mins. $59.00   21-30 mins. $85.00   Use CoachSeek (secure email communication and access  to your chart) to send your primary care provider a message or make an appointment. Ask someone on your Team how to sign up for [employer].    For a Price Quote for your services, please call our Consumer Price Line at [phone].    As always, Thank you for trusting us with your health care needs!    Discharged by Pearl THOMAS CMA ([facility])

## 2017-06-07 ENCOUNTER — MEDICAL CORRESPONDENCE (OUTPATIENT)
Dept: HEALTH INFORMATION MANAGEMENT | Facility: CLINIC | Age: 72
End: 2017-06-07

## 2017-06-07 ENCOUNTER — TELEPHONE (OUTPATIENT)
Dept: FAMILY MEDICINE | Facility: CLINIC | Age: 72
End: 2017-06-07

## 2017-06-07 DIAGNOSIS — Z53.9 ERRONEOUS ENCOUNTER--DISREGARD: Primary | ICD-10-CM

## 2017-06-07 DIAGNOSIS — Z53.9 DIAGNOSIS NOT YET DEFINED: Primary | ICD-10-CM

## 2017-06-07 PROCEDURE — G0180 MD CERTIFICATION HHA PATIENT: HCPCS | Performed by: FAMILY MEDICINE

## 2017-06-07 NOTE — TELEPHONE ENCOUNTER
Talked with patient she is going to try to take tylenol during the day and norco before bed. Pt. reports having some brown stuff on pillow feels it could be acid reflux or dried blood from her sinuses.  No other questions or concerns.  Kiraa Valdez RN

## 2017-06-07 NOTE — TELEPHONE ENCOUNTER
I agree. She needs to stop the norco as it can certainly upset her stomach. Tylenol alone will not upset her stomach.     DAVIE HENDERSON M.D.

## 2017-06-07 NOTE — TELEPHONE ENCOUNTER
Reason for Call:  Other prescription    Detailed comments: patient was prescribed Vicodin and also Tylenol the patient believes one of these medication is causing her to have an upset stomach, please contact the patient to discuss the next steps    Phone Number Patient can be reached at: Home number on file [phone] (home)    Best Time: today    Can we leave a detailed message on this number? YES    Call taken on 6/7/2017 at 11:03 AM by Den Garrido

## 2017-06-07 NOTE — TELEPHONE ENCOUNTER
Per patient, she has been taking the Norco and Tylenol as recommended and taking it with crackers.  She is having nausea and upset stomach.  Advised her that if she is not tolerating the medication, she should not take it.    Please advise further.    Driss Schaffer RN

## 2017-06-08 ENCOUNTER — TRANSFERRED RECORDS (OUTPATIENT)
Dept: HEALTH INFORMATION MANAGEMENT | Facility: CLINIC | Age: 72
End: 2017-06-08

## 2017-06-08 LAB
CREAT SERPL-MCNC: 0.7 MG/DL (ref 0.6–1.1)
GLUCOSE SERPL-MCNC: 131 MG/DL (ref 70–140)
POTASSIUM SERPL-SCNC: 4 MMOL/L (ref 3.6–5.2)

## 2017-06-09 ENCOUNTER — TRANSFERRED RECORDS (OUTPATIENT)
Dept: HEALTH INFORMATION MANAGEMENT | Facility: CLINIC | Age: 72
End: 2017-06-09

## 2017-06-16 PROBLEM — K21.9 GASTROESOPHAGEAL REFLUX DISEASE WITHOUT ESOPHAGITIS: Status: ACTIVE | Noted: 2017-06-16

## 2017-06-22 ENCOUNTER — OFFICE VISIT (OUTPATIENT)
Dept: FAMILY MEDICINE | Facility: CLINIC | Age: 72
End: 2017-06-22
Payer: COMMERCIAL

## 2017-06-22 VITALS
DIASTOLIC BLOOD PRESSURE: 63 MMHG | OXYGEN SATURATION: 100 % | TEMPERATURE: 96.7 F | SYSTOLIC BLOOD PRESSURE: 107 MMHG | HEART RATE: 100 BPM

## 2017-06-22 DIAGNOSIS — K29.00 ACUTE GASTRITIS, PRESENCE OF BLEEDING UNSPECIFIED, UNSPECIFIED GASTRITIS TYPE: Primary | ICD-10-CM

## 2017-06-22 DIAGNOSIS — Z98.890 S/P VERTEBROPLASTY: ICD-10-CM

## 2017-06-22 DIAGNOSIS — M32.9 SYSTEMIC LUPUS ERYTHEMATOSUS (H): ICD-10-CM

## 2017-06-22 DIAGNOSIS — I10 HYPERTENSION GOAL BP (BLOOD PRESSURE) < 140/90: ICD-10-CM

## 2017-06-22 DIAGNOSIS — M81.0 OSTEOPOROSIS: ICD-10-CM

## 2017-06-22 DIAGNOSIS — Z79.52 CURRENT CHRONIC USE OF SYSTEMIC STEROIDS: ICD-10-CM

## 2017-06-22 DIAGNOSIS — Z79.899 HIGH RISK MEDICATION USE: ICD-10-CM

## 2017-06-22 LAB
ERYTHROCYTE [DISTWIDTH] IN BLOOD BY AUTOMATED COUNT: 16.6 % (ref 10–15)
HCT VFR BLD AUTO: 33.1 % (ref 35–47)
HGB BLD-MCNC: 10.3 G/DL (ref 11.7–15.7)
MCH RBC QN AUTO: 33.1 PG (ref 26.5–33)
MCHC RBC AUTO-ENTMCNC: 31.1 G/DL (ref 31.5–36.5)
MCV RBC AUTO: 106 FL (ref 78–100)
PLATELET # BLD AUTO: 512 10E9/L (ref 150–450)
RBC # BLD AUTO: 3.11 10E12/L (ref 3.8–5.2)
WBC # BLD AUTO: 11 10E9/L (ref 4–11)

## 2017-06-22 PROCEDURE — 85027 COMPLETE CBC AUTOMATED: CPT | Performed by: FAMILY MEDICINE

## 2017-06-22 PROCEDURE — 36415 COLL VENOUS BLD VENIPUNCTURE: CPT | Performed by: FAMILY MEDICINE

## 2017-06-22 PROCEDURE — 99214 OFFICE O/P EST MOD 30 MIN: CPT | Performed by: FAMILY MEDICINE

## 2017-06-22 RX ORDER — PANTOPRAZOLE SODIUM 40 MG/1
40 TABLET, DELAYED RELEASE ORAL DAILY
Qty: 30 TABLET | Refills: 0 | Status: SHIPPED | OUTPATIENT
Start: 2017-06-22 | End: 2017-09-13

## 2017-06-22 NOTE — MR AVS SNAPSHOT
After Visit Summary   6/22/2017    Marlys A Tietz    MRN: [id number]           Patient Information     Date Of Birth          1945        Visit Information        Provider Department      6/22/2017 12:00 PM Ella Rodriguez MD [facility]        Today's Diagnoses     Acute gastritis, presence of bleeding unspecified, unspecified gastritis type    -  1    S/P vertebroplasty          Care Instructions    Take Protonix 40 mg B 1 hour before Largest meal  Follow up if any Black stools or Vomiting  Take care  Ella Rodriguez MD      [facility]    If you have any questions regarding to your visit please contact your care team:       Team Red:   Clinic Hours Telephone Number   Dr. Flora Martines, NP   7am-7pm  Monday - Thursday   7am-5pm  Fridays  [phone]  (Appointment scheduling available 24/7)    Questions about your visit?   Team Line  [phone]   Urgent Care - [place] and [place] - 11am-9pm Monday-Friday Saturday-Sunday- 9am-5pm   [place] - 5pm-9pm Monday-Friday Saturday-Sunday- 9am-5pm  [phone] - [place]   [phone] - [place]       What options do I have for visits at the clinic other than the traditional office visit?  To expand how we care for you, many of our providers are utilizing electronic visits (e-visits) and telephone visits, when medically appropriate, for interactions with their patients rather than a visit in the clinic.   We also offer nurse visits for many medical concerns. Just like any other service, we will bill your insurance company for this type of visit based on time spent on the phone with your provider. Not all insurance companies cover these visits. Please check with your medical insurance if this type of visit is covered. You will be responsible for any charges that are not paid by your insurance.      E-visits via You.Do:  generally incur a $35.00  fee.  Telephone visits:  Time spent on the phone: *charged based on time that is spent on the phone in increments of 10 minutes. Estimated cost:   5-10 mins $30.00   11-20 mins. $59.00   21-30 mins. $85.00     Use My Perfect Gighart (secure email communication and access to your chart) to send your primary care provider a message or make an appointment. Ask someone on your Team how to sign up for [employer].  For a Price Quote for your services, please call our TVPage Line at [phone].      As always, Thank you for trusting us with your health care needs!    Eugenie Beatty, KOMAL            Follow-ups after your visit        Additional Services     GASTROENTEROLOGY ADULT REF PROCEDURE ONLY       Last Lab Result: Creatinine (mg/dL)       Date                     Value                 09/23/2016               0.79             ----------  There is no height or weight on file to calculate BMI.     Needed:  No  Language:  English    Patient will be contacted to schedule procedure.     Please be aware that coverage of these services is subject to the terms and limitations of your health insurance plan.  Call member services at your health plan with any benefit or coverage questions.  Any procedures must be performed at a [facility] facility OR coordinated by your clinic's referral office.    Please bring the following with you to your appointment:    (1) Any X-Rays, CTs or MRIs which have been performed.  Contact the facility where they were done to arrange for  prior to your scheduled appointment.    (2) List of current medications   (3) This referral request   (4) Any documents/labs given to you for this referral                  Who to contact     If you have questions or need follow up information about today's clinic visit or your schedule please contact [facility] [place] directly at [phone].  Normal or non-critical lab and imaging results will be communicated to you by MyChart, letter or  phone within 4 business days after the clinic has received the results. If you do not hear from us within 7 days, please contact the clinic through Experifun or phone. If you have a critical or abnormal lab result, we will notify you by phone as soon as possible.  Submit refill requests through Experifun or call your pharmacy and they will forward the refill request to us. Please allow 3 business days for your refill to be completed.          Additional Information About Your Visit        [employer] Information     Experifun gives you secure access to your electronic health record. If you see a primary care provider, you can also send messages to your care team and make appointments. If you have questions, please call your primary care clinic.  If you do not have a primary care provider, please call [phone] and they will assist you.        Care EveryWhere ID     This is your Care EveryWhere ID. This could be used by other organizations to access your [place] medical records  [id number]        Your Vitals Were     Pulse Temperature Pulse Oximetry Breastfeeding?          100 96.7  F (35.9  C) (Oral) 100% No         Blood Pressure from Last 3 Encounters:   06/22/17 107/63   06/02/17 109/70   05/09/17 120/78    Weight from Last 3 Encounters:   03/03/17 139 lb (63 kg)   02/19/17 141 lb 4.8 oz (64.1 kg)   01/03/17 139 lb (63 kg)              We Performed the Following     CBC with platelets     GASTROENTEROLOGY ADULT REF PROCEDURE ONLY          Today's Medication Changes          These changes are accurate as of: 6/22/17 12:40 PM.  If you have any questions, ask your nurse or doctor.               These medicines have changed or have updated prescriptions.        Dose/Directions    pantoprazole 40 MG EC tablet   Commonly known as:  PROTONIX   This may have changed:  See the new instructions.   Used for:  Acute gastritis, presence of bleeding unspecified, unspecified gastritis type   Changed by:  Ella Rodriguez MD         Dose:  40 mg   Take 1 tablet (40 mg) by mouth daily   Quantity:  30 tablet   Refills:  0            Where to get your medicines      These medications were sent to [id number] - [place], [place] - [address]  [address], [place] [address]     Phone:  [phone]     pantoprazole 40 MG EC tablet                Primary Care Provider Office Phone # Fax #    Ella Rodriguez [phone]       [facility] [address]  [place] [address]        Equal Access to Services     [facility]: Hadii aad ku hadasho Soomaali, waaxda luqadaha, qaybta kaalmada adeegyada, cal ramesh . So [facility] [phone].    ATENCIÓN: Si habla español, tiene a tate disposición servicios gratuitos de asistencia lingüística. Llame al [phone].    We comply with applicable federal civil rights laws and [place] laws. We do not discriminate on the basis of race, color, national origin, age, disability sex, sexual orientation or gender identity.            Thank you!     Thank you for choosing [facility]  for your care. Our goal is always to provide you with excellent care. Hearing back from our patients is one way we can continue to improve our services. Please take a few minutes to complete the written survey that you may receive in the mail after your visit with us. Thank you!             Your Updated Medication List - Protect others around you: Learn how to safely use, store and throw away your medicines at www.disposemymeds.org.          This list is accurate as of: 6/22/17 12:40 PM.  Always use your most recent med list.                   Brand Name Dispense Instructions for use Diagnosis    aspirin 81 MG tablet      Take  by mouth daily.        atorvastatin 20 MG tablet    LIPITOR    30 tablet    TAKE ONE TABLET BY MOUTH ONCE DAILY -    Hyperlipidemia LDL goal <130       azaTHIOprine 50 MG tablet    IMURAN     Take 50 mg by mouth  daily.        BONIVA 3 MG/3ML   Generic drug:  ibandronate      Inject 3 mg into the vein once Up to the rheumatologist        budesonide 3 MG 24 hr capsule    ENTOCORT EC     Take 3 mg by mouth 2 times daily.        CALTRATE 600+D PO      TWO TAB DAILY        cephALEXin 500 MG capsule    KEFLEX    4 capsule    2 grams  Half hour before Procedure    Need for subacute bacterial endocarditis prophylaxis       HYDROcodone-acetaminophen 5-325 MG per tablet    NORCO    90 tablet    Take 1 tablet by mouth every 6 hours as needed for moderate to severe pain maximum 4 tablet(s) per day    Compression fracture of L1 lumbar vertebra, with routine healing, subsequent encounter       lisinopril 5 MG tablet    PRINIVIL/ZESTRIL    90 tablet    Take 1 tablet (5 mg) by mouth daily    Essential hypertension with goal blood pressure less than 140/90       MULTIVITAMIN TABS   OR      1 TABLET DAILY        pantoprazole 40 MG EC tablet    PROTONIX    30 tablet    Take 1 tablet (40 mg) by mouth daily    Acute gastritis, presence of bleeding unspecified, unspecified gastritis type       predniSONE 2.5 MG tablet    DELTASONE     Take 5 mg by mouth daily 4 for the first week 20mg,  3.5 the next 17.5mg, 3 the next 15mg, 2.5 the next 12.5mg, 2 the next 10mg, 1.5 the next 7.5mg, finally ending with 1 5mg        tretinoin 0.1 % cream    RETIN-A          TYLENOL EXTRA STRENGTH 500 MG Tabs      1 TABLET EVERY 4 HOURS AS NEEDED

## 2017-06-22 NOTE — PATIENT INSTRUCTIONS
Take Protonix 40 mg B 1 hour before Largest meal  Follow up if any Black stools or Vomiting  Take care  Ella Rodriguez MD      [facility]    If you have any questions regarding to your visit please contact your care team:       Team Red:   Clinic Hours Telephone Number   Dr. Flora Martines, NP   7am-7pm  Monday - Thursday   7am-5pm  Fridays  [phone]  (Appointment scheduling available 24/7)    Questions about your visit?   Team Line  [phone]   Urgent Care - [place] - 11am-9pm Monday-Friday Saturday-Sunday- 9am-5pm   [place] - 5pm-9pm Monday-Friday Saturday-Sunday- 9am-5pm  [phone] - [place]   [phone] - [place]       What options do I have for visits at the clinic other than the traditional office visit?  To expand how we care for you, many of our providers are utilizing electronic visits (e-visits) and telephone visits, when medically appropriate, for interactions with their patients rather than a visit in the clinic.   We also offer nurse visits for many medical concerns. Just like any other service, we will bill your insurance company for this type of visit based on time spent on the phone with your provider. Not all insurance companies cover these visits. Please check with your medical insurance if this type of visit is covered. You will be responsible for any charges that are not paid by your insurance.      E-visits via Card Isle:  generally incur a $35.00 fee.  Telephone visits:  Time spent on the phone: *charged based on time that is spent on the phone in increments of 10 minutes. Estimated cost:   5-10 mins $30.00   11-20 mins. $59.00   21-30 mins. $85.00     Use [employer] (secure email communication and access to your chart) to send your primary care provider a message or make an appointment. Ask someone on your Team how to sign up for [employer] Isle.  For a Price Quote for your services, please call  our Consumer Price Line at [phone].      As always, Thank you for trusting us with your health care needs!    Eugenie Beatty, CMA

## 2017-06-22 NOTE — NURSING NOTE
"Chief Complaint   Patient presents with     Surgical Followup     post op      Wound Check     left elbow check       Initial /63 (BP Location: Left arm, Patient Position: Chair, Cuff Size: Adult Regular)  Pulse 100  Temp 96.7  F (35.9  C) (Oral)  SpO2 100%  Breastfeeding? No Estimated body mass index is 24.24 kg/(m^2) as calculated from the following:    Height as of 3/3/17: 5' 3.5\" (1.613 m).    Weight as of 3/3/17: 139 lb (63 kg).  Medication Reconciliation: complete     Jose Luis Campos      "

## 2017-06-22 NOTE — PROGRESS NOTES
SUBJECTIVE:                                                    Marlys A Tietz is a 72 year old female who presents to clinic today for the following health issues:  Chief Complaint   Patient presents with     Surgical Followup     post op      Wound Check     left elbow check     Hospital Follow-up Visit:    Hospital/Nursing Home/IP Rehab Facility: [facility] [address]   Date of Admission: 06/19/2017  Date of Discharge: out patient   Reason(s) for Admission: Vertobroplasty       Pt is slightly better  Last week she had coffee Ground emesis  She went To [facility] and They put her On Protonix-Her Hemoglobin has Remained stable   She had a hemoglobin done on Monday this week and it was 10.2  She is doing well  The patient denies abdominal or flank pain, anorexia, nausea or vomiting, dysphagia, change in bowel habits or black or bloody stools.  No hematemesis         Problems taking medications regularly: none         Medication changes since discharge: Pantoprazole doesn't luba to last        Problems adhering to non-medication therapy: once a day is doing the physical therapy     Summary of hospitalization: reviewed in [facility]  Pt will sign CAITLIN to get Records From [place]  Diagnostic Tests/Treatments reviewed.  Follow up needed: none  Other Healthcare Providers Involved in Patient s Care:         None  Update since discharge: improved.     Post Discharge Medication Reconciliation: discharge medications reconciled, continue medications without change.  Plan of care communicated with patient     Coding guidelines for this visit:  Type of Medical   Decision Making Face-to-Face Visit       within 7 Days of discharge Face-to-Face Visit        within 14 days of discharge   Moderate Complexity [id number]   High Complexity [id number]          ED/UC Followup:    Facility: [facility]   Date of visit: 06/08/2017  Reason for visit: throwing up black material, and weakness increased   Current Status: still  having weakness            Problem list and histories reviewed & adjusted, as indicated.  Additional history: as documented    Patient Active Problem List   Diagnosis     HYPERLIPIDEMIA LDL GOAL <130     Vasculitis (H)     Fibromyalgia     Osteoporosis     Osteoarthritis     Systemic lupus erythematosus (H)     Advanced directives, counseling/discussion     Lymphocytic colitis     Hypertension goal BP (blood pressure) < 140/90     Impaired glucose tolerance     Diverticulitis     Diverticulitis of colon     Tear of right rotator cuff     Tear of left rotator cuff     Hip abductor tear     H/O bacterial endocarditis     H/O endocarditis     High risk medication use     Gastroesophageal reflux disease without esophagitis     Past Surgical History:   Procedure Laterality Date     NO HISTORY OF SURGERY         Social History   Substance Use Topics     Smoking status: Never Smoker     Smokeless tobacco: Never Used     Alcohol use 0.5 oz/week     1 Standard drinks or equivalent per week     Family History   Problem Relation Age of Onset     Hypertension Mother      OSTEOPOROSIS Mother          Current Outpatient Prescriptions   Medication Sig Dispense Refill     pantoprazole (PROTONIX) 40 MG EC tablet Take 1 tablet (40 mg) by mouth daily 30 tablet 0     tretinoin (RETIN-A) 0.1 % cream        atorvastatin (LIPITOR) 20 MG tablet TAKE ONE TABLET BY MOUTH ONCE DAILY - 30 tablet 10     lisinopril (PRINIVIL,ZESTRIL) 5 MG tablet Take 1 tablet (5 mg) by mouth daily 90 tablet 3     ibandronate (BONIVA) 3 MG/3ML Inject 3 mg into the vein once Up to the rheumatologist       budesonide (ENTOCORT EC) 3 MG 24 hr capsule Take 3 mg by mouth 2 times daily.       predniSONE (DELTASONE) 2.5 MG tablet Take 5 mg by mouth daily 4 for the first week 20mg,  3.5 the next 17.5mg, 3 the next 15mg, 2.5 the next 12.5mg, 2 the next 10mg, 1.5 the next 7.5mg, finally ending with 1 5mg       azathioprine (IMURAN) 50 MG tablet Take 50 mg by mouth daily.        CALTRATE 600+D OR TWO TAB DAILY       aspirin 81 MG tablet Take  by mouth daily.       MULTIVITAMIN TABS   OR 1 TABLET DAILY       TYLENOL EXTRA STRENGTH 500 MG OR TABS 1 TABLET EVERY 4 HOURS AS NEEDED       HYDROcodone-acetaminophen (NORCO) 5-325 MG per tablet Take 1 tablet by mouth every 6 hours as needed for moderate to severe pain maximum 4 tablet(s) per day (Patient not taking: Reported on 6/22/2017) 90 tablet 0     cephALEXin (KEFLEX) 500 MG capsule 2 grams  Half hour before Procedure (Patient not taking: Reported on 6/22/2017) 4 capsule 3     Allergies   Allergen Reactions     Penicillins      Reaction unknown     Sulfa Drugs      Was told by mother     Recent Labs   Lab Test  01/03/17   1115  09/23/16   1156 08/18/15 08/15/15  07/22/15   1650  02/18/15   1036   02/20/14   0942   A1C  5.9   --    --    --    --    --    --    --    LDL  69  102*   --    --    --   76   --   95   HDL  104  86   --    --    --   102   --   97   TRIG  185*  167*   --    --    --   183*   --   98   ALT  32   --    --   20  25  30   --   29   CR   --   0.79  0.8  1.0  0.87  0.76   < >  0.79   GFRESTIMATED   --   72   --   58*  64  76   < >  72   GFRESTBLACK   --   87   --   >60  77  >90   GFR Calc     < >  87   POTASSIUM   --   4.4  4.6  4.4  4.5  4.3   < >  3.9   TSH   --    --    --    --    --    --    --   1.82    < > = values in this interval not displayed.      BP Readings from Last 3 Encounters:   06/22/17 107/63   06/02/17 109/70   05/09/17 120/78    Wt Readings from Last 3 Encounters:   03/03/17 139 lb (63 kg)   02/19/17 141 lb 4.8 oz (64.1 kg)   01/03/17 139 lb (63 kg)                  Labs reviewed in EPIC    Reviewed and updated as needed this visit by clinical staff  Tobacco  Allergies  Meds  Med Hx  Surg Hx  Fam Hx  Soc Hx      Reviewed and updated as needed this visit by Provider         ROS:  C: NEGATIVE for fever, chills, change in weight  E/M: NEGATIVE for ear, mouth and throat  problems  R: NEGATIVE for significant cough or SOB  CV: NEGATIVE for chest pain, palpitations or peripheral edema  GI: as above  NEURO: NEGATIVE  dizziness or paresthesias  PSYCHIATRIC: NEGATIVE for changes in mood or affect    OBJECTIVE:                                                    /63 (BP Location: Left arm, Patient Position: Chair, Cuff Size: Adult Regular)  Pulse 100  Temp 96.7  F (35.9  C) (Oral)  SpO2 100%  Breastfeeding? No  There is no height or weight on file to calculate BMI.  GENERAL: alert, frail and elderly  NECK: no adenopathy, no asymmetry, masses, or scars and thyroid normal to palpation  RESP: lungs clear to auscultation - no rales, rhonchi or wheezes  CV: regular rate and rhythm, normal S1 S2, no S3 or S4, no murmur, click or rub, no peripheral edema and peripheral pulses strong  ABDOMEN: soft, nontender, no hepatosplenomegaly, no masses and bowel sounds normal  MS: no gross musculoskeletal defects noted, no edema  No tenderness back  Diagnostic Test Results:  Results for orders placed or performed in visit on 06/22/17 (from the past 24 hour(s))   CBC with platelets   Result Value Ref Range    WBC 11.0 4.0 - 11.0 10e9/L    RBC Count 3.11 (L) 3.8 - 5.2 10e12/L    Hemoglobin 10.3 (L) 11.7 - 15.7 g/dL    Hematocrit 33.1 (L) 35.0 - 47.0 %     (H) 78 - 100 fl    MCH 33.1 (H) 26.5 - 33.0 pg    MCHC 31.1 (L) 31.5 - 36.5 g/dL    RDW 16.6 (H) 10.0 - 15.0 %    Platelet Count 512 (H) 150 - 450 10e9/L        ASSESSMENT/PLAN:                                                        1. Acute gastritis, presence of bleeding unspecified, unspecified gastritis type  Pt has not had any further coffee Ground emesis  Advised sign CAITLIN so I can get her Information from [facility] with platelets  - pantoprazole (PROTONIX) 40 MG EC tablet; Take 1 tablet (40 mg) by mouth daily  Dispense: 30 tablet; Refill: 0  - GASTROENTEROLOGY ADULT REF PROCEDURE ONLY  A referral has been done for Endoscopy  If any  further symptoms-black stools,abdominal pain,weakness or coffee Ground emesis  Go to ER  2. S/P vertebroplasty  Stable -slightly better    3. Systemic lupus erythematosus (H)      4. Hypertension goal BP (blood pressure) < 140/90  controlled    5. High risk medication use      6. Current chronic use of systemic steroids      7. Osteoporosis  Consider Treatment when  Doing well-will discuss 1 month when pt follows up    Pt has Home care  Follow up 1 month  Ella Rodriguez MD  [facility]

## 2017-06-22 NOTE — LETTER
[address] [place] [address]    June 22, 2017    Marlys A Tietz  [address] [facility] [id number]          Dear Oanh,  Blood count is stable.  Please schedule endoscopy as recommended.  Take care.  Enclosed is a copy of your results.     Results for orders placed or performed in visit on 06/22/17   CBC with platelets   Result Value Ref Range    WBC 11.0 4.0 - 11.0 10e9/L    RBC Count 3.11 (L) 3.8 - 5.2 10e12/L    Hemoglobin 10.3 (L) 11.7 - 15.7 g/dL    Hematocrit 33.1 (L) 35.0 - 47.0 %     (H) 78 - 100 fl    MCH 33.1 (H) 26.5 - 33.0 pg    MCHC 31.1 (L) 31.5 - 36.5 g/dL    RDW 16.6 (H) 10.0 - 15.0 %    Platelet Count 512 (H) 150 - 450 10e9/L       If you have any questions or concerns, please call myself or my nurse at [phone].      Sincerely,        Ella Rodriguez MD/pb

## 2017-06-23 ENCOUNTER — TELEPHONE (OUTPATIENT)
Dept: FAMILY MEDICINE | Facility: CLINIC | Age: 72
End: 2017-06-23

## 2017-06-23 NOTE — TELEPHONE ENCOUNTER
Patient was seen with Dr. Rodriguez yesterday and is noting diarrhea today. Please call and advise. Thank you

## 2017-06-23 NOTE — TELEPHONE ENCOUNTER
She needs To take something due To coffee Ground emesis-she cn go back to Prilosec 20 mg daily as she has Tolerated This better  If worse Go to ER

## 2017-06-23 NOTE — TELEPHONE ENCOUNTER
"Marlys A Tietz is a 72 year old female who calls with complaints of diarrhea.    NURSING ASSESSMENT:  Description:  Patient states that she has had diarrhea twice today. She is wondering if it could be caused by the protonix and if she should continue to take it.   Onset/duration:  today  Precip. factors:  Was seen on 6/22 for acute gastritis.  Associated symptoms:  Abdominal discomfort  Improves/worsens symptoms:  nothing  Pain scale (0-10)   Not pain but feels \"yuck\"  Last exam/Treatment:  6/22/17  Allergies:   Allergies   Allergen Reactions     Penicillins      Reaction unknown     Sulfa Drugs      Was told by mother       MEDICATIONS:   Taking medication(s) as prescribed? No  Taking over the counter medication(s?) No  Any medication side effects? Not Applicable    Any barriers to taking medication(s) as prescribed?  No  Medication(s) improving/managing symptoms?  N/A  Medication reconciliation completed: No      NURSING PLAN: Routed to provider Yes    RECOMMENDED DISPOSITION:  See in 24 hours - Also gave home care instructions per Adult Diarrhea guideline  Will comply with recommendation: No, states was just there yesterday  If further questions/concerns or if symptoms do not improve, worsen or new symptoms develop, call your PCP or [facility] as soon as possible.      Guideline used: Diarrhea: Adult  Telephone Triage Protocols for Nurses, Fourth Edition, Melinda Cotton RN    "

## 2017-06-23 NOTE — TELEPHONE ENCOUNTER
Patient has only had 2 loose stools today. She thinks it may be from her Protonix and is wondering if she can stop taking the medication. She has no other symptoms and verbalized understanding she should go to the ED for worsening of symptoms.       Please advise    Adelita Cotton RN - BC

## 2017-06-23 NOTE — TELEPHONE ENCOUNTER
Patient states her symptoms are improving. She has not had any further loose stools.   Huddled with Dr. Rodriguez who advised patient needs to   1) Stay on Protonix or  2) Go back to taking Omeprazole.    Patient notified of providers message. Patient verbalized understanding and has no further questions or concerns. Is not sure which option she will take.    Adelita Cotton RN - BC

## 2017-06-26 ENCOUNTER — TELEPHONE (OUTPATIENT)
Dept: FAMILY MEDICINE | Facility: CLINIC | Age: 72
End: 2017-06-26

## 2017-06-26 NOTE — TELEPHONE ENCOUNTER
Left message to call TC line at [phone]. Jia Mejia,       Need the patient to fill out the CAITLIN to request records.

## 2017-06-26 NOTE — TELEPHONE ENCOUNTER
----- Message from Ella Rodriguez MD sent at 6/23/2017  8:01 AM CDT -----  Need Records From [facility]

## 2017-06-29 NOTE — TELEPHONE ENCOUNTER
Called and spoke with Oanh. She will be working on trying to get the records faxed to the clinic from [facility].    CAITLIN's have been mailed to the patient's address in the chart. Jia Mejia,

## 2017-07-07 DIAGNOSIS — Z53.9 DIAGNOSIS NOT YET DEFINED: Primary | ICD-10-CM

## 2017-07-07 PROCEDURE — G0179 MD RECERTIFICATION HHA PT: HCPCS | Performed by: FAMILY MEDICINE

## 2017-07-13 ENCOUNTER — TRANSFERRED RECORDS (OUTPATIENT)
Dept: HEALTH INFORMATION MANAGEMENT | Facility: CLINIC | Age: 72
End: 2017-07-13

## 2017-07-25 DIAGNOSIS — M32.19 OTHER ORGAN OR SYSTEM INVOLVEMENT IN SYSTEMIC LUPUS ERYTHEMATOSUS (H): Primary | ICD-10-CM

## 2017-07-25 DIAGNOSIS — Z79.899 HIGH RISK MEDICATION USE: ICD-10-CM

## 2017-08-31 NOTE — PROGRESS NOTES
SUBJECTIVE:   Marlys A Tietz is a 72 year old female who presents to clinic today for the following health issues:    Chief Complaint   Patient presents with     RECHECK     follow up Dermatology     Results   Pt is Going to be seen at [facility] for her Rheumatological diagnosois  Her back pain is better-she takes Tylenol  She had some labs done at her Rheumatology and Potassium was Borderline Low  She was advised to follow up   She has a appointment at [facility]-next month  Overall she is stable   She has skin lesions removed at [facility]-results are pending       Problem list and histories reviewed & adjusted, as indicated.  Additional history: as documented    Patient Active Problem List   Diagnosis     HYPERLIPIDEMIA LDL GOAL <130     Vasculitis (H)     Fibromyalgia     Osteoporosis     Osteoarthritis     Systemic lupus erythematosus (H)     Advanced directives, counseling/discussion     Lymphocytic colitis     Hypertension goal BP (blood pressure) < 140/90     Impaired glucose tolerance     Diverticulitis     Diverticulitis of colon     Tear of right rotator cuff     Tear of left rotator cuff     Hip abductor tear     H/O bacterial endocarditis     H/O endocarditis     High risk medication use     Past Surgical History:   Procedure Laterality Date     NO HISTORY OF SURGERY         Social History   Substance Use Topics     Smoking status: Never Smoker     Smokeless tobacco: Never Used     Alcohol use 0.5 oz/week     1 Standard drinks or equivalent per week     Family History   Problem Relation Age of Onset     Hypertension Mother      OSTEOPOROSIS Mother          Current Outpatient Prescriptions   Medication Sig Dispense Refill     OMEPRAZOLE PO Take 10 mg by mouth daily       tretinoin (RETIN-A) 0.1 % cream        atorvastatin (LIPITOR) 20 MG tablet TAKE ONE TABLET BY MOUTH ONCE DAILY - 30 tablet 10     cephALEXin (KEFLEX) 500 MG capsule 2 grams  Half hour before Procedure 4 capsule 3     lisinopril  (PRINIVIL,ZESTRIL) 5 MG tablet Take 1 tablet (5 mg) by mouth daily 90 tablet 3     budesonide (ENTOCORT EC) 3 MG 24 hr capsule Take 3 mg by mouth 2 times daily.       predniSONE (DELTASONE) 2.5 MG tablet Take 5 mg by mouth daily 4 for the first week 20mg,  3.5 the next 17.5mg, 3 the next 15mg, 2.5 the next 12.5mg, 2 the next 10mg, 1.5 the next 7.5mg, finally ending with 1 5mg       azathioprine (IMURAN) 50 MG tablet Take 50 mg by mouth daily.       CALTRATE 600+D OR TWO TAB DAILY       aspirin 81 MG tablet Take  by mouth daily.       MULTIVITAMIN TABS   OR 1 TABLET DAILY       TYLENOL EXTRA STRENGTH 500 MG OR TABS 1 TABLET EVERY 4 HOURS AS NEEDED       HYDROcodone-acetaminophen (NORCO) 5-325 MG per tablet Take 1 tablet by mouth every 6 hours as needed for moderate to severe pain maximum 4 tablet(s) per day (Patient not taking: Reported on 9/13/2017) 90 tablet 0     ibandronate (BONIVA) 3 MG/3ML Inject 3 mg into the vein once Up to the rheumatologist       Allergies   Allergen Reactions     Penicillins      Reaction unknown     Sulfa Drugs      Was told by mother     Recent Labs   Lab Test  09/13/17   1530 06/08/17 01/03/17   1115  09/23/16   1156  08/15/15  07/22/15   1650  02/18/15   1036   02/20/14   0942   A1C  5.8   --   5.9   --    --    --    --    --    --    --    LDL   --    --   69  102*   --    --    --   76   --   95   HDL   --    --   104  86   --    --    --   102   --   97   TRIG   --    --   185*  167*   --    --    --   183*   --   98   ALT   --    --   32   --    --   20  25  30   --   29   CR   --   0.7   --   0.79   < >  1.0  0.87  0.76   < >  0.79   GFRESTIMATED   --    --    --   72   --   58*  64  76   < >  72   GFRESTBLACK   --    --    --   87   --   >60  77  >90   GFR Calc     < >  87   POTASSIUM   --   4.0   --   4.4   < >  4.4  4.5  4.3   < >  3.9   TSH   --    --    --    --    --    --    --    --    --   1.82    < > = values in this interval not displayed.      BP  "Readings from Last 3 Encounters:   09/13/17 124/72   06/22/17 107/63   06/02/17 109/70    Wt Readings from Last 3 Encounters:   03/03/17 139 lb (63 kg)   02/19/17 141 lb 4.8 oz (64.1 kg)   01/03/17 139 lb (63 kg)                  Labs reviewed in EPIC          Reviewed and updated as needed this visit by clinical staff       Reviewed and updated as needed this visit by Provider       ROS:  C: NEGATIVE for fever, chills, change in weight  INTEGUMENTARY/SKIN: NEGATIVE for worrisome rashes, moles or lesions  E/M: NEGATIVE for ear, mouth and throat problems  R: NEGATIVE for significant cough or SOB  CV: NEGATIVE for chest pain, palpitations or peripheral edema  GI: NEGATIVE for nausea, abdominal pain, heartburn, or change in bowel habits  PSYCHIATRIC: NEGATIVE for changes in mood or affect    OBJECTIVE:     /72 (BP Location: Left arm, Patient Position: Chair, Cuff Size: Adult Regular)  Pulse 100  Temp 99.7  F (37.6  C) (Oral)  Resp 16  Ht 5' 3.5\" (1.613 m)  SpO2 99%  Breastfeeding? No  There is no height or weight on file to calculate BMI.  GENERAL: alert and frail  NECK: no adenopathy, no asymmetry, masses, or scars and thyroid normal to palpation  RESP: lungs clear to auscultation - no rales, rhonchi or wheezes  CV: regular rate and rhythm, normal S1 S2, no S3 or S4, no murmur, click or rub, no peripheral edema and peripheral pulses strong  ABDOMEN: soft, nontender, no hepatosplenomegaly, no masses and bowel sounds normal  MS: arthritis    Diagnostic Test Results:  Pending     ASSESSMENT/PLAN:       ICD-10-CM    1. Hypertension goal BP (blood pressure) < 140/90 I10 BASIC METABOLIC PANEL   2. High risk medication use Z79.899    3. Osteoporosis, unspecified osteoporosis type, unspecified pathological fracture presence M81.0    4. Hyperglycemia R73.9 Hemoglobin A1c   5. Hypokalemia E87.6    6. Systemic lupus erythematosus, unspecified SLE type, unspecified organ involvement status (H) M32.9    7. " Gastroesophageal reflux disease with esophagitis K21.0    8. Need for prophylactic vaccination and inoculation against influenza Z23 FLU VACCINE, INCREASED ANTIGEN, PRESV FREE, AGE 65+ [05421]     ADMIN INFLUENZA (For MEDICARE Patients ONLY) []     Pt  Has appointment at [facility]  Advised High Potassium foods  Labs pending   Glucose  was Borderline High-Pt not sure if this was a fasting test     Will repeat  BP is stable    Flu shot done  .  Follow up 6 months   22 minutes spent  Face to face  with patient with over 50 % visit spent in counseling and coordination of care discussing her medical issues    Ella Rodriguez MD  [facility]

## 2017-08-31 NOTE — PATIENT INSTRUCTIONS
[facility]    If you have any questions regarding to your visit please contact your care team:       Team Red:   Clinic Hours Telephone Number   Dr. Flora Ross  (pediatrics)  Kiara Martines NP 7am-7pm  Monday - Thursday   7am-5pm  Fridays  [phone] (fax)    Josh MOTA  [phone]   Urgent Care - [place] and [place] Monday-Friday  [place] - 11am-8pm  Saturday-Sunday  Both sites - 9am-5pm  [phone] - [facility]  [phone] - [place]       What options do I have for visits at the clinic other than the traditional office visit?  To expand how we care for you, many of our providers are utilizing electronic visits (e-visits) and telephone visits, when medically appropriate, for interactions with their patients rather than a visit in the clinic.   We also offer nurse visits for many medical concerns. Just like any other service, we will bill your insurance company for this type of visit based on time spent on the phone with your provider. Not all insurance companies cover these visits. Please check with your medical insurance if this type of visit is covered. You will be responsible for any charges that are not paid by your insurance.      E-visits via [employer]:  generally incur a $35.00 fee.  Telephone visits:  Time spent on the phone: *charged based on time that is spent on the phone in increments of 10 minutes. Estimated cost:   5-10 mins $30.00   11-20 mins. $59.00   21-30 mins. $85.00     As always, Thank you for trusting us with your health care needs!              Potassium-Rich Foods  The normal adult diet usually contains 2,000 mg to 4,000 mg of potassium per day. More potassium is needed when you lose too much potassium from your body. This can happen if you have diarrhea or vomiting. It can also happen if you take a medicine to make you urinate more (diuretic). To increase the amount of potassium in your diet, include  these high-potassium foods.     [The (*) indicates foods highest in potassium.]  Vegetables  Artichokes. Cooked 1/2 cup, 200 mg to 300 mg*  Asparagus. Cooked 1/2 cup, 200 mg to 300 mg  Beans. White, red, ridley cooked 1/2 cup, 300 mg to 500 mg*  Beets. Cooked 1/2 cup, 200 mg to 300 mg  Broccoli. Cooked or raw 1 cup, 200 mg to 500 mg*  [place] sprouts. Cooked 1/2 cup, 200 mg to 300 mg  Cabbage. Raw 1 cup, 100 mg to 200 mg  Carrots. Raw or cooked 1/2 cup, 100 mg to 200 mg  Celery. Raw 1 cup, 200 mg to 300 mg  Lima beans. Fresh or frozen 1/2 cup, 300 mg to 500 mg*   Mushrooms. Raw or cooked 1/2 cup, 100 mg to 300 mg  Peas. Cooked 1/2 cup, 150 mg to 250 mg   Potatoes. Baked 1 medium, 500 mg to 900 mg*   Spinach. Cooked 1 cup, 800 mg to 900 mg*   Spinach. Raw 2 cups, 300 mg to 400 mg *  Squash, winter. Fresh, frozen, or cooked 1/2 cup, 200 mg to 400 mg   Tomato. Fresh 1 medium, 200 mg to 300 mg   Tomato juice. Canned 1/2 cup, 200 mg to 300 mg   Fruits  Apple juice. Unsweetened 1 cup, 200 mg to 300 mg   Apricots. Canned 1/2 cup, 200 mg to 300 mg   Apricots. Dried 4 pieces, 100 mg to 200 mg   Avocado. Raw 1/2 cup, 300 mg to 400 mg*  Banana. Fresh 1 small, 300 mg to 400 mg*   Cantaloupe. Fresh 1 cup diced, 300 mg to 400 mg*   Grape juice. Unsweetened 1 cup, 200 mg to 300 mg   Honeydew melon. Fresh 1 cup diced, 300 mg to 400 mg*   Orange. Fresh 1 medium, 200 mg to 300 mg    Orange juice. Unsweetened, fresh or frozen 1/2 cup, 200 mg to 300 mg  Pineapple juice. Unsweetened 1 cup, 300 mg to 400 mg   Prune juice. Unsweetened 1/2 cup, 300 mg to 400 mg*   Prunes. Dried 5 pieces, 300 mg to 400 mg*   Strawberries. Fresh or frozen 1 cup, 200 mg to 300 mg  Meat  Red meat. Cooked 3 ounces, 100 mg to 300 mg   Seafood  Cod, flounder, halibut. Cooked 3 ounces, 100 mg to 300 mg*  [place]. Cooked, 3 ounces 300 mg to 400 mg*   Scallops. Cooked 3 ounces, 200 mg to 300 mg*  Shrimp. Cooked 3/4 cup, 100 mg to 200 mg   Tuna. Fresh or canned 3/4  cup, 200 mg to 500 mg   Date Last Reviewed: 10/1/2016    3062-8032 [employer]. [address] [place] [address], [place] [address]. All rights reserved. This information is not intended as a substitute for professional medical care. Always follow your healthcare professional's instructions.      [facility]    If you have any questions regarding to your visit please contact your care team:       Team Red:   Clinic Hours Telephone Number   Dr. Flora Martines, NP   7am-7pm  Monday - Thursday   7am-5pm  Fridays  [phone]  (Appointment scheduling available 24/7)    Questions about your visit?   Team Line  [phone]   Urgent Care - [place] and [facility] - 11am-9pm Monday-Friday Saturday-Sunday- 9am-5pm   [place] - 5pm-9pm Monday-Friday Saturday-Sunday- 9am-5pm  [phone] - [facility]  [phone] - [place]       What options do I have for visits at the clinic other than the traditional office visit?  To expand how we care for you, many of our providers are utilizing electronic visits (e-visits) and telephone visits, when medically appropriate, for interactions with their patients rather than a visit in the clinic.   We also offer nurse visits for many medical concerns. Just like any other service, we will bill your insurance company for this type of visit based on time spent on the phone with your provider. Not all insurance companies cover these visits. Please check with your medical insurance if this type of visit is covered. You will be responsible for any charges that are not paid by your insurance.      E-visits via [email or web address]:  generally incur a $35.00 fee.  Telephone visits:  Time spent on the phone: *charged based on time that is spent on the phone in increments of 10 minutes. Estimated cost:   5-10 mins $30.00   11-20 mins. $59.00   21-30 mins. $85.00     Use [email or web address] (secure email communication and access to your  chart) to send your primary care provider a message or make an appointment. Ask someone on your Team how to sign up for ticcklet.  For a Price Quote for your services, please call our Consumer Price Line at [phone].      As always, Thank you for trusting us with your health care needs!  Discharged by Kriss COVARRUBIAS CMA ([facility])

## 2017-09-13 ENCOUNTER — OFFICE VISIT (OUTPATIENT)
Dept: FAMILY MEDICINE | Facility: CLINIC | Age: 72
End: 2017-09-13
Payer: COMMERCIAL

## 2017-09-13 VITALS
HEIGHT: 64 IN | HEART RATE: 100 BPM | OXYGEN SATURATION: 99 % | DIASTOLIC BLOOD PRESSURE: 72 MMHG | TEMPERATURE: 99.7 F | RESPIRATION RATE: 16 BRPM | SYSTOLIC BLOOD PRESSURE: 124 MMHG

## 2017-09-13 DIAGNOSIS — Z23 NEED FOR PROPHYLACTIC VACCINATION AND INOCULATION AGAINST INFLUENZA: ICD-10-CM

## 2017-09-13 DIAGNOSIS — R73.9 HYPERGLYCEMIA: ICD-10-CM

## 2017-09-13 DIAGNOSIS — E87.6 HYPOKALEMIA: ICD-10-CM

## 2017-09-13 DIAGNOSIS — M81.0 OSTEOPOROSIS, UNSPECIFIED OSTEOPOROSIS TYPE, UNSPECIFIED PATHOLOGICAL FRACTURE PRESENCE: ICD-10-CM

## 2017-09-13 DIAGNOSIS — M32.9 SYSTEMIC LUPUS ERYTHEMATOSUS, UNSPECIFIED SLE TYPE, UNSPECIFIED ORGAN INVOLVEMENT STATUS (H): ICD-10-CM

## 2017-09-13 DIAGNOSIS — K21.00 GASTROESOPHAGEAL REFLUX DISEASE WITH ESOPHAGITIS: ICD-10-CM

## 2017-09-13 DIAGNOSIS — Z79.899 HIGH RISK MEDICATION USE: ICD-10-CM

## 2017-09-13 DIAGNOSIS — I10 HYPERTENSION GOAL BP (BLOOD PRESSURE) < 140/90: Primary | ICD-10-CM

## 2017-09-13 PROBLEM — K21.9 GASTROESOPHAGEAL REFLUX DISEASE WITHOUT ESOPHAGITIS: Status: RESOLVED | Noted: 2017-06-16 | Resolved: 2017-09-13

## 2017-09-13 LAB
ANION GAP SERPL CALCULATED.3IONS-SCNC: 8 MMOL/L (ref 3–14)
BUN SERPL-MCNC: 25 MG/DL (ref 7–30)
CALCIUM SERPL-MCNC: 9.1 MG/DL (ref 8.5–10.1)
CHLORIDE SERPL-SCNC: 105 MMOL/L (ref 94–109)
CO2 SERPL-SCNC: 30 MMOL/L (ref 20–32)
CREAT SERPL-MCNC: 0.55 MG/DL (ref 0.52–1.04)
GFR SERPL CREATININE-BSD FRML MDRD: >90 ML/MIN/1.7M2
GLUCOSE SERPL-MCNC: 126 MG/DL (ref 70–99)
HBA1C MFR BLD: 5.8 % (ref 4.3–6)
POTASSIUM SERPL-SCNC: 3.4 MMOL/L (ref 3.4–5.3)
SODIUM SERPL-SCNC: 143 MMOL/L (ref 133–144)

## 2017-09-13 PROCEDURE — G0008 ADMIN INFLUENZA VIRUS VAC: HCPCS | Performed by: FAMILY MEDICINE

## 2017-09-13 PROCEDURE — 99214 OFFICE O/P EST MOD 30 MIN: CPT | Mod: 25 | Performed by: FAMILY MEDICINE

## 2017-09-13 PROCEDURE — 90662 IIV NO PRSV INCREASED AG IM: CPT | Performed by: FAMILY MEDICINE

## 2017-09-13 PROCEDURE — 80048 BASIC METABOLIC PNL TOTAL CA: CPT | Performed by: FAMILY MEDICINE

## 2017-09-13 PROCEDURE — 36415 COLL VENOUS BLD VENIPUNCTURE: CPT | Performed by: FAMILY MEDICINE

## 2017-09-13 PROCEDURE — 83036 HEMOGLOBIN GLYCOSYLATED A1C: CPT | Performed by: FAMILY MEDICINE

## 2017-09-13 NOTE — PROGRESS NOTES
Injectable Influenza Immunization Documentation    1.  Are you sick today? (Fever of 100.5 or higher on the day of the clinic)   No    2.  Have you ever had Guillain-Boydton Syndrome within 6 weeks of an influenza vaccionation?  No    3. Do you have a life-threatening allergy to eggs?  No    4. Do you have a life-threatening allergy to a component of the vaccine? May include antibiotics, gelatin or latex.  No     5. Have you ever had a reaction to a dose of flu vaccine that needed immediate medical attention?  No     Form completed by patient

## 2017-09-13 NOTE — NURSING NOTE
Per Dr. Rodriguez, patient was given (2) 325 mg Tylenol tablets.   Lot-[id number], Exp- 1/2019, [id number]  Kriss COVARRUBIAS CMA ([facility])

## 2017-09-13 NOTE — NURSING NOTE
"Chief Complaint   Patient presents with     RECHECK     follow up Dermatology     Results       Initial /72 (BP Location: Left arm, Patient Position: Chair, Cuff Size: Adult Regular)  Pulse 100  Temp 99.7  F (37.6  C) (Oral)  Resp 16  Ht 5' 3.5\" (1.613 m)  SpO2 99%  Breastfeeding? No Estimated body mass index is 24.24 kg/(m^2) as calculated from the following:    Height as of 3/3/17: 5' 3.5\" (1.613 m).    Weight as of 3/3/17: 139 lb (63 kg).  Medication Reconciliation: complete     Jose Luis Campos      "

## 2017-09-13 NOTE — MR AVS SNAPSHOT
After Visit Summary   9/13/2017    Marlys A Tietz    MRN: [id number]           Patient Information     Date Of Birth          1945        Visit Information        Provider Department      9/13/2017 2:40 PM Ella Rodriguez MD [facility]        Today's Diagnoses     Need for prophylactic vaccination and inoculation against influenza    -  1    Hypertension goal BP (blood pressure) < 140/90        High risk medication use        Osteoporosis, unspecified osteoporosis type, unspecified pathological fracture presence        Hyperglycemia        Gastroesophageal reflux disease with esophagitis          Care Instructions    [facility]    If you have any questions regarding to your visit please contact your care team:       Team Red:   Clinic Hours Telephone Number   Dr. Flora Ross  (pediatrics)  Kiara Martines NP 7am-7pm  Monday - Thursday   7am-5pm  Fridays  [phone] (fax)    Josh MOTA  [phone]   Urgent Care - [place] and [place] Monday-Friday  [place] - 11am-8pm  Saturday-Sunday  Both sites - 9am-5pm  [phone] - [facility]  [phone] - [place]       What options do I have for visits at the clinic other than the traditional office visit?  To expand how we care for you, many of our providers are utilizing electronic visits (e-visits) and telephone visits, when medically appropriate, for interactions with their patients rather than a visit in the clinic.   We also offer nurse visits for many medical concerns. Just like any other service, we will bill your insurance company for this type of visit based on time spent on the phone with your provider. Not all insurance companies cover these visits. Please check with your medical insurance if this type of visit is covered. You will be responsible for any charges that are not paid by your insurance.      E-visits via [employer]:  generally incur a  $35.00 fee.  Telephone visits:  Time spent on the phone: *charged based on time that is spent on the phone in increments of 10 minutes. Estimated cost:   5-10 mins $30.00   11-20 mins. $59.00   21-30 mins. $85.00     As always, Thank you for trusting us with your health care needs!              Potassium-Rich Foods  The normal adult diet usually contains 2,000 mg to 4,000 mg of potassium per day. More potassium is needed when you lose too much potassium from your body. This can happen if you have diarrhea or vomiting. It can also happen if you take a medicine to make you urinate more (diuretic). To increase the amount of potassium in your diet, include these high-potassium foods.     [The (*) indicates foods highest in potassium.]  Vegetables  Artichokes. Cooked 1/2 cup, 200 mg to 300 mg*  Asparagus. Cooked 1/2 cup, 200 mg to 300 mg  Beans. White, red, ridley cooked 1/2 cup, 300 mg to 500 mg*  Beets. Cooked 1/2 cup, 200 mg to 300 mg  Broccoli. Cooked or raw 1 cup, 200 mg to 500 mg*  Nikolai sprouts. Cooked 1/2 cup, 200 mg to 300 mg  Cabbage. Raw 1 cup, 100 mg to 200 mg  Carrots. Raw or cooked 1/2 cup, 100 mg to 200 mg  Celery. Raw 1 cup, 200 mg to 300 mg  Lima beans. Fresh or frozen 1/2 cup, 300 mg to 500 mg*   Mushrooms. Raw or cooked 1/2 cup, 100 mg to 300 mg  Peas. Cooked 1/2 cup, 150 mg to 250 mg   Potatoes. Baked 1 medium, 500 mg to 900 mg*   Spinach. Cooked 1 cup, 800 mg to 900 mg*   Spinach. Raw 2 cups, 300 mg to 400 mg *  Squash, winter. Fresh, frozen, or cooked 1/2 cup, 200 mg to 400 mg   Tomato. Fresh 1 medium, 200 mg to 300 mg   Tomato juice. Canned 1/2 cup, 200 mg to 300 mg   Fruits  Apple juice. Unsweetened 1 cup, 200 mg to 300 mg   Apricots. Canned 1/2 cup, 200 mg to 300 mg   Apricots. Dried 4 pieces, 100 mg to 200 mg   Avocado. Raw 1/2 cup, 300 mg to 400 mg*  Banana. Fresh 1 small, 300 mg to 400 mg*   Cantaloupe. Fresh 1 cup diced, 300 mg to 400 mg*   Grape juice. Unsweetened 1 cup, 200 mg to 300  mg   Honeydew melon. Fresh 1 cup diced, 300 mg to 400 mg*   Orange. Fresh 1 medium, 200 mg to 300 mg    Orange juice. Unsweetened, fresh or frozen 1/2 cup, 200 mg to 300 mg  Pineapple juice. Unsweetened 1 cup, 300 mg to 400 mg   Prune juice. Unsweetened 1/2 cup, 300 mg to 400 mg*   Prunes. Dried 5 pieces, 300 mg to 400 mg*   Strawberries. Fresh or frozen 1 cup, 200 mg to 300 mg  Meat  Red meat. Cooked 3 ounces, 100 mg to 300 mg   Seafood  Cod, flounder, halibut. Cooked 3 ounces, 100 mg to 300 mg*  [place]. Cooked, 3 ounces 300 mg to 400 mg*   Scallops. Cooked 3 ounces, 200 mg to 300 mg*  Shrimp. Cooked 3/4 cup, 100 mg to 200 mg   Tuna. Fresh or canned 3/4 cup, 200 mg to 500 mg   Date Last Reviewed: 10/1/2016    5384-4847 [employer]. [address] [place] [address]. All rights reserved. This information is not intended as a substitute for professional medical care. Always follow your healthcare professional's instructions.      [facility]    If you have any questions regarding to your visit please contact your care team:       Team Red:   Clinic Hours Telephone Number   Dr. Flora Martines, NP   7am-7pm  Monday - Thursday   7am-5pm  Fridays  [phone]  (Appointment scheduling available 24/7)    Questions about your visit?   Team Line  [phone]   [facility] [place] [facility] 11am-9pm Monday-Friday Saturday-Sunday- 9am-5pm   [place] - 5pm-9pm Monday-Friday Saturday-Sunday- 9am-5pm  [phone] - [place]   [phone] - [place]       What options do I have for visits at the clinic other than the traditional office visit?  To expand how we care for you, many of our providers are utilizing electronic visits (e-visits) and telephone visits, when medically appropriate, for interactions with their patients rather than a visit in the clinic.   We also offer nurse visits for many medical concerns.  Just like any other service, we will bill your insurance company for this type of visit based on time spent on the phone with your provider. Not all insurance companies cover these visits. Please check with your medical insurance if this type of visit is covered. You will be responsible for any charges that are not paid by your insurance.      E-visits via [employer]:  generally incur a $35.00 fee.  Telephone visits:  Time spent on the phone: *charged based on time that is spent on the phone in increments of 10 minutes. Estimated cost:   5-10 mins $30.00   11-20 mins. $59.00   21-30 mins. $85.00     Use [employer] (secure email communication and access to your chart) to send your primary care provider a message or make an appointment. Ask someone on your Team how to sign up for [employer].  For a Price Quote for your services, please call our [employer] Line at [phone].      As always, Thank you for trusting us with your health care needs!  Discharged by Kriss COVARRUBIAS CMA ([facility])            Follow-ups after your visit        Who to contact     If you have questions or need follow up information about today's clinic visit or your schedule please contact [facility] directly at [phone].  Normal or non-critical lab and imaging results will be communicated to you by [employer]art, letter or phone within 4 business days after the clinic has received the results. If you do not hear from us within 7 days, please contact the clinic through [employer] or phone. If you have a critical or abnormal lab result, we will notify you by phone as soon as possible.  Submit refill requests through [employer] or call your pharmacy and they will forward the refill request to us. Please allow 3 business days for your refill to be completed.          Additional Information About Your Visit        [employer] Information     [employer] gives you secure access to your electronic health record. If you see a primary care provider, you can also  "send messages to your care team and make appointments. If you have questions, please call your primary care clinic.  If you do not have a primary care provider, please call [phone] and they will assist you.        Care EveryWhere ID     This is your Care EveryWhere ID. This could be used by other organizations to access your [place] medical records  [id number]        Your Vitals Were     Pulse Temperature Respirations Height Pulse Oximetry Breastfeeding?    100 99.7  F (37.6  C) (Oral) 16 5' 3.5\" (1.613 m) 99% No       Blood Pressure from Last 3 Encounters:   09/13/17 124/72   06/22/17 107/63   06/02/17 109/70    Weight from Last 3 Encounters:   03/03/17 139 lb (63 kg)   02/19/17 141 lb 4.8 oz (64.1 kg)   01/03/17 139 lb (63 kg)              We Performed the Following     BASIC METABOLIC PANEL     Hemoglobin A1c        Primary Care Provider Office Phone # Fax #    Ella Rodriguez [phone] [address] [facility] [address]        Equal Access to Services     [facility]: Hadii aad ku hadasho Soomaali, waaxda luqadaha, qaybta kaalmada adegrzegorzyada, cal ramesh . So [facility] [phone].    ATENCIÓN: Si habla español, tiene a tate disposición servicios gratuitos de asistencia lingüística. [facility] [phone].    We comply with applicable federal civil rights laws and [place] laws. We do not discriminate on the basis of race, color, national origin, age, disability sex, sexual orientation or gender identity.            Thank you!     Thank you for choosing [facility]  for your care. Our goal is always to provide you with excellent care. Hearing back from our patients is one way we can continue to improve our services. Please take a few minutes to complete the written survey that you may receive in the mail after your visit with us. Thank you!             Your Updated Medication List - Protect others around you: Learn how to safely use, store " and throw away your medicines at www.disposemymeds.org.          This list is accurate as of: 9/13/17  3:18 PM.  Always use your most recent med list.                   Brand Name Dispense Instructions for use Diagnosis    aspirin 81 MG tablet      Take  by mouth daily.        atorvastatin 20 MG tablet    LIPITOR    30 tablet    TAKE ONE TABLET BY MOUTH ONCE DAILY -    Hyperlipidemia LDL goal <130       azaTHIOprine 50 MG tablet    IMURAN     Take 50 mg by mouth daily.        BONIVA 3 MG/3ML   Generic drug:  ibandronate      Inject 3 mg into the vein once Up to the rheumatologist        budesonide 3 MG 24 hr capsule    ENTOCORT EC     Take 3 mg by mouth 2 times daily.        CALTRATE 600+D PO      TWO TAB DAILY        cephALEXin 500 MG capsule    KEFLEX    4 capsule    2 grams  Half hour before Procedure    Need for subacute bacterial endocarditis prophylaxis       HYDROcodone-acetaminophen 5-325 MG per tablet    NORCO    90 tablet    Take 1 tablet by mouth every 6 hours as needed for moderate to severe pain maximum 4 tablet(s) per day    Compression fracture of L1 lumbar vertebra, with routine healing, subsequent encounter       lisinopril 5 MG tablet    PRINIVIL/ZESTRIL    90 tablet    Take 1 tablet (5 mg) by mouth daily    Essential hypertension with goal blood pressure less than 140/90       MULTIVITAMIN TABS   OR      1 TABLET DAILY        OMEPRAZOLE PO      Take 10 mg by mouth daily        predniSONE 2.5 MG tablet    DELTASONE     Take 5 mg by mouth daily 4 for the first week 20mg,  3.5 the next 17.5mg, 3 the next 15mg, 2.5 the next 12.5mg, 2 the next 10mg, 1.5 the next 7.5mg, finally ending with 1 5mg        tretinoin 0.1 % cream    RETIN-A          TYLENOL EXTRA STRENGTH 500 MG Tabs      1 TABLET EVERY 4 HOURS AS NEEDED

## 2017-09-15 DIAGNOSIS — Z53.9 DIAGNOSIS NOT YET DEFINED: Primary | ICD-10-CM

## 2017-09-15 PROCEDURE — G0179 MD RECERTIFICATION HHA PT: HCPCS | Performed by: FAMILY MEDICINE

## 2017-10-31 ENCOUNTER — MEDICAL CORRESPONDENCE (OUTPATIENT)
Dept: HEALTH INFORMATION MANAGEMENT | Facility: CLINIC | Age: 72
End: 2017-10-31

## 2017-11-09 ENCOUNTER — TRANSFERRED RECORDS (OUTPATIENT)
Dept: HEALTH INFORMATION MANAGEMENT | Facility: CLINIC | Age: 72
End: 2017-11-09

## 2017-11-15 ENCOUNTER — TELEPHONE (OUTPATIENT)
Dept: FAMILY MEDICINE | Facility: CLINIC | Age: 72
End: 2017-11-15

## 2017-11-16 DIAGNOSIS — J84.9 ILD (INTERSTITIAL LUNG DISEASE) (H): Primary | ICD-10-CM

## 2017-11-16 NOTE — TELEPHONE ENCOUNTER
Reason for Call: Request for an order or referral:    Order or referral being requested: [facility] to Eval and treat for Home care    Date needed: as soon as possible    Has the patient been seen by the PCP for this problem? YES    Additional comments: NA    Phone number Patient can be reached at:  Other phone number:  Kriss [phone]    Best Time:  Any time    Can we leave a detailed message on this number?  YES    Call taken on 11/15/2017 at 7:29 PM by Berta Radford

## 2017-11-21 DIAGNOSIS — Z53.9 DIAGNOSIS NOT YET DEFINED: Primary | ICD-10-CM

## 2017-11-21 PROCEDURE — G0179 MD RECERTIFICATION HHA PT: HCPCS | Performed by: FAMILY MEDICINE

## 2017-11-28 ENCOUNTER — MEDICAL CORRESPONDENCE (OUTPATIENT)
Dept: HEALTH INFORMATION MANAGEMENT | Facility: CLINIC | Age: 72
End: 2017-11-28

## 2017-12-08 ENCOUNTER — TRANSFERRED RECORDS (OUTPATIENT)
Dept: HEALTH INFORMATION MANAGEMENT | Facility: CLINIC | Age: 72
End: 2017-12-08

## 2017-12-13 ENCOUNTER — MEDICAL CORRESPONDENCE (OUTPATIENT)
Dept: HEALTH INFORMATION MANAGEMENT | Facility: CLINIC | Age: 72
End: 2017-12-13

## 2017-12-22 ENCOUNTER — MEDICAL CORRESPONDENCE (OUTPATIENT)
Dept: HEALTH INFORMATION MANAGEMENT | Facility: CLINIC | Age: 72
End: 2017-12-22

## 2017-12-26 ENCOUNTER — TELEPHONE (OUTPATIENT)
Dept: FAMILY MEDICINE | Facility: CLINIC | Age: 72
End: 2017-12-26

## 2017-12-26 NOTE — TELEPHONE ENCOUNTER
Reason for call:question  Patient called regarding (reason for call): They are wondering if the patients leg needs to be wrapped every day?  Additional comments: Please call to discuss further      Phone number to reach patient:  Other phone number:  [phone]*    Best Time:  anytime    Can we leave a detailed message on this number?  YES

## 2017-12-26 NOTE — TELEPHONE ENCOUNTER
Spoke with clinical supervisor. RN was unable to find any orders related to home care and wrapping legs. Clinical supervisor said patient is planing on calling and scheduling appointment with Dr. Rodriguez in the near future just for a check up.    Elin Ramos RN

## 2017-12-28 ENCOUNTER — MEDICAL CORRESPONDENCE (OUTPATIENT)
Dept: HEALTH INFORMATION MANAGEMENT | Facility: CLINIC | Age: 72
End: 2017-12-28

## 2018-01-04 ENCOUNTER — TRANSFERRED RECORDS (OUTPATIENT)
Dept: HEALTH INFORMATION MANAGEMENT | Facility: CLINIC | Age: 73
End: 2018-01-04

## 2018-01-09 ENCOUNTER — MEDICAL CORRESPONDENCE (OUTPATIENT)
Dept: HEALTH INFORMATION MANAGEMENT | Facility: CLINIC | Age: 73
End: 2018-01-09

## 2018-01-19 ENCOUNTER — MEDICAL CORRESPONDENCE (OUTPATIENT)
Dept: HEALTH INFORMATION MANAGEMENT | Facility: CLINIC | Age: 73
End: 2018-01-19

## 2018-01-19 DIAGNOSIS — Z53.9 DIAGNOSIS NOT YET DEFINED: Primary | ICD-10-CM

## 2018-01-19 PROCEDURE — G0179 MD RECERTIFICATION HHA PT: HCPCS | Performed by: FAMILY MEDICINE

## 2018-01-25 ENCOUNTER — TRANSFERRED RECORDS (OUTPATIENT)
Dept: HEALTH INFORMATION MANAGEMENT | Facility: CLINIC | Age: 73
End: 2018-01-25

## 2018-02-05 ENCOUNTER — MEDICAL CORRESPONDENCE (OUTPATIENT)
Dept: HEALTH INFORMATION MANAGEMENT | Facility: CLINIC | Age: 73
End: 2018-02-05

## 2018-02-08 ENCOUNTER — TELEPHONE (OUTPATIENT)
Dept: FAMILY MEDICINE | Facility: CLINIC | Age: 73
End: 2018-02-08

## 2018-02-08 NOTE — TELEPHONE ENCOUNTER
Reason for call:med question  Patient called regarding (reason for call): They would like to have a current med list and a call back to discuss a new injection medication prescribed that they do not have an order for.  Additional comments: fax is [phone] Att:Suzanna    Phone number to reach patient:  Other phone number:[phone]  Best Time:  asap    Can we leave a detailed message on this number?  YES

## 2018-02-08 NOTE — TELEPHONE ENCOUNTER
Spoke with Suzanna. States pt is taking Forteo daily injections prescribed by Shama Alamo. Was taught how to use this. Updated medication list with this medication. She would like a copy of the updated med list faxed. TC, please print and fax updated med list. Thanks.    Kriss Segovia RN  [facility]

## 2018-02-13 ENCOUNTER — MEDICAL CORRESPONDENCE (OUTPATIENT)
Dept: HEALTH INFORMATION MANAGEMENT | Facility: CLINIC | Age: 73
End: 2018-02-13

## 2018-03-08 ENCOUNTER — TELEPHONE (OUTPATIENT)
Dept: FAMILY MEDICINE | Facility: CLINIC | Age: 73
End: 2018-03-08

## 2018-03-08 NOTE — TELEPHONE ENCOUNTER
Per SHELLEY Dobson RN with [facility].  Patient has been in and out of the hospital since last seen by Dr. Rodriguez in Sept/2017.  HC is currently treating an open area on the left shin that is 90% slough and 10% pink    Olya just wanted to update provider that they will plan on trying Medihoney  If provider does not agree, please call back, otherwise they will proceed with plan and no call back needed    Driss Schaffer RN

## 2018-03-08 NOTE — TELEPHONE ENCOUNTER
Reason for Call:  Other call back    Detailed comments:  Olya calling to get orders for home care. Wound care for two weeks. It's 90% slough and 10% pink.     What type of covering is recommended. optifoam is used now.     Phone Number Patient can be reached at: Other phone number:  [phone]    Best Time:  any    Can we leave a detailed message on this number? YES    Call taken on 3/8/2018 at 10:35 AM by Janie Medina

## 2018-03-09 ENCOUNTER — TELEPHONE (OUTPATIENT)
Dept: FAMILY MEDICINE | Facility: CLINIC | Age: 73
End: 2018-03-09

## 2018-03-09 NOTE — TELEPHONE ENCOUNTER
Olya was given provider's message as written. She will let pt know at next visit regarding appt needed.    Kriss Segovia RN  [facility]

## 2018-03-09 NOTE — TELEPHONE ENCOUNTER
Spoke with Olya. States pt had a skin tear on left upper arm. Flap is there. Is not awful. Wants to see her more frequently for a couple of weeks to keep an eye on it. Gave verbal ok for orders requested per RN protocol.    Kriss Segovia RN  [facility]

## 2018-03-09 NOTE — TELEPHONE ENCOUNTER
Reason for Call:  Other call back    Detailed comments: Patient has a skin tear and  wants to visit 2 x's next week. Verbal order ok.    Phone Number Patient can be reached at: Other phone number:  [phone] Olya Leon Time: anytime    Can we leave a detailed message on this number? YES    Call taken on 3/9/2018 at 1:55 PM by Leslie Lance

## 2018-03-13 ENCOUNTER — MEDICAL CORRESPONDENCE (OUTPATIENT)
Dept: HEALTH INFORMATION MANAGEMENT | Facility: CLINIC | Age: 73
End: 2018-03-13

## 2018-03-16 PROCEDURE — G0179 MD RECERTIFICATION HHA PT: HCPCS | Performed by: FAMILY MEDICINE

## 2018-03-21 ENCOUNTER — TELEPHONE (OUTPATIENT)
Dept: FAMILY MEDICINE | Facility: CLINIC | Age: 73
End: 2018-03-21

## 2018-03-21 NOTE — TELEPHONE ENCOUNTER
Reason for Call:  Other call back    Detailed comments:  Olya calling to get a verbal order for wound care.  Two times per week.  Please call.     Phone Number Patient can be reached at: Other phone number:   [phone]    Best Time:  Any     Can we leave a detailed message on this number? YES    Call taken on 3/21/2018 at 1:21 PM by Janie Medina

## 2018-03-21 NOTE — TELEPHONE ENCOUNTER
Spoke with Olya, verbal orders given for wound care and to follow up with PCP if no improvement. Patient has been seen in past for ulcers.    Elin Ramos RN

## 2018-03-30 ENCOUNTER — MEDICAL CORRESPONDENCE (OUTPATIENT)
Dept: HEALTH INFORMATION MANAGEMENT | Facility: CLINIC | Age: 73
End: 2018-03-30

## 2018-04-05 ENCOUNTER — TELEPHONE (OUTPATIENT)
Dept: FAMILY MEDICINE | Facility: CLINIC | Age: 73
End: 2018-04-05

## 2018-04-05 NOTE — TELEPHONE ENCOUNTER
Call she would need to go to wound care clinic or if a wound care Nurse can see her   Okay to increase Nurse visits  Pt also follow up with MD

## 2018-04-05 NOTE — TELEPHONE ENCOUNTER
Reason for Call:  Other call back    Detailed comments:  Olya calling. Oanh has leg wounds. No improvement. Please call to advise.     Phone Number Patient can be reached at: Other phone number:  [phone]    Best Time:  Any     Can we leave a detailed message on this number? YES    Call taken on 4/5/2018 at 1:26 PM by Janie Medina

## 2018-04-05 NOTE — TELEPHONE ENCOUNTER
Olya was given provider's message. Closing encounter. No further action needed.    Kriss Segovia RN  [facility]

## 2018-04-05 NOTE — TELEPHONE ENCOUNTER
Spoke with Olya. Has wounds on left leg ankle area. Has slough on it and can't get it off. Has tried honey and had some drainage so tried algninate. Has wounds on the other side that were closed and now draining. Alginate is helping for the other side, but not the leg with slough. Wondering what can be done with the wound with the slough? Is she going to need debridement? How long to try wound cares? She is getting wound cares twice a week. Used to go to a wound clinic, but does not anymore. Suggested increasing her nurse visits to three times a week and checking to see if there is a [facility] nurse that could see her.     Will also route to PCP to see if any further recommendations. She is also due for an appt with PCP, so advised she should schedule this.    Kriss Segovia RN  [facility]

## 2018-04-12 ENCOUNTER — TRANSFERRED RECORDS (OUTPATIENT)
Dept: HEALTH INFORMATION MANAGEMENT | Facility: CLINIC | Age: 73
End: 2018-04-12

## 2018-04-16 ENCOUNTER — TELEPHONE (OUTPATIENT)
Dept: FAMILY MEDICINE | Facility: CLINIC | Age: 73
End: 2018-04-16

## 2018-04-16 NOTE — TELEPHONE ENCOUNTER
Reason for Call:  Other Wound care orders    Detailed comments: Change to Iodosorb and a Coban light compression wrap. Please contact Kaylah with questions.    Phone Number Patient can be reached at: Other phone number:  [phone]    Best Time: Anytime    Can we leave a detailed message on this number? YES    Call taken on 4/16/2018 at 3:32 PM by Leslie Lance

## 2018-04-17 ENCOUNTER — MEDICAL CORRESPONDENCE (OUTPATIENT)
Dept: HEALTH INFORMATION MANAGEMENT | Facility: CLINIC | Age: 73
End: 2018-04-17

## 2018-04-17 NOTE — TELEPHONE ENCOUNTER
Detailed VM left on patients phone regarding providers message below. Advised to call back with questions, [phone].    Elin Ramos RN

## 2018-04-20 ENCOUNTER — TELEPHONE (OUTPATIENT)
Dept: FAMILY MEDICINE | Facility: CLINIC | Age: 73
End: 2018-04-20

## 2018-04-20 NOTE — TELEPHONE ENCOUNTER
Spoke with April. She saw pt last night at 7:30pm and  will see her today, this afternoon. Vitals were stable. Had feet twisted when trying to get off the bed. Advised per previous notes that pt is due for a follow up with PCP. April will have CM assist pt with scheduling with PCP. Routing to PCP as FYI.    Kriss Segovia RN  [facility]

## 2018-04-20 NOTE — TELEPHONE ENCOUNTER
Reason for Call:  Other Patient fall    Detailed comments: Patient called RN to report a fall; when they picked her up she has a skin tear on RT elbow 3.6 cm x 2.4 cm; also on left shoulder/upper arm 3.8cm x 7cm; bruising right below 3.6cm x6cm. RN relaid the skin and put on bacitracin and gauze. Call with any questions. They will follow up with her wounds today.    Phone Number Patient can be reached at: Other phone number:  [phone] April    Best Time: ASAP    Can we leave a detailed message on this number? YES    Call taken on 4/20/2018 at 8:46 AM by Leslie Lance

## 2018-04-24 ENCOUNTER — MEDICAL CORRESPONDENCE (OUTPATIENT)
Dept: HEALTH INFORMATION MANAGEMENT | Facility: CLINIC | Age: 73
End: 2018-04-24

## 2018-05-15 NOTE — TELEPHONE ENCOUNTER
azathioprine (IMURAN) 50 MG tablet     Last Written Prescription Date:    Last Fill Quantity: ,   # refills:   Last Office Visit: 09/13/2017  Future Office visit:       Routing refill request to provider for review/approval because:  Medication is reported/historical    Mabel Kerns MA

## 2018-05-17 ENCOUNTER — TELEPHONE (OUTPATIENT)
Dept: FAMILY MEDICINE | Facility: CLINIC | Age: 73
End: 2018-05-17

## 2018-05-17 RX ORDER — AZATHIOPRINE 50 MG/1
TABLET ORAL
Qty: 90 TABLET | Refills: 0 | OUTPATIENT
Start: 2018-05-17

## 2018-05-17 NOTE — TELEPHONE ENCOUNTER
Reason for Call:  Other update    Detailed comments: Rhonda states Oanh had home health aid set up her medication but they are not suppose to do this. Therefore nursing will continue to set up medication not home health aid.     Phone Number Patient can be reached at: Other phone number: [phone]    Best Time: any    Can we leave a detailed message on this number? YES    Call taken on 5/17/2018 at 12:02 PM by Davian Molina

## 2018-05-18 ENCOUNTER — TRANSFERRED RECORDS (OUTPATIENT)
Dept: HEALTH INFORMATION MANAGEMENT | Facility: CLINIC | Age: 73
End: 2018-05-18

## 2018-05-23 ENCOUNTER — MEDICAL CORRESPONDENCE (OUTPATIENT)
Dept: HEALTH INFORMATION MANAGEMENT | Facility: CLINIC | Age: 73
End: 2018-05-23

## 2018-05-23 DIAGNOSIS — Z53.9 DIAGNOSIS NOT YET DEFINED: Primary | ICD-10-CM

## 2018-05-23 PROCEDURE — G0179 MD RECERTIFICATION HHA PT: HCPCS | Performed by: FAMILY MEDICINE

## 2018-06-08 ENCOUNTER — TELEPHONE (OUTPATIENT)
Dept: FAMILY MEDICINE | Facility: CLINIC | Age: 73
End: 2018-06-08

## 2018-06-08 NOTE — TELEPHONE ENCOUNTER
Reason for Call:  Home Health Care    [facility] (reason for call): orders for ot    Orders are needed for this patient. ot    PT: no    OT: ot to come out for an evaluation    Skilled Nursing: no  Pt Provider: dr. steward    Phone Number Homecare Nurse can be reached at: [phone]    Can we leave a detailed message on this number? YES    Phone number patient can be reached at: Home number on file [phone] (home)    Best Time: any    Call taken on 6/8/2018 at 3:08 PM by Quita Balderas

## 2018-06-08 NOTE — TELEPHONE ENCOUNTER
Verbal OK given to Olya for requested orders.   No questions at this time.     Elin Ramos, RN, BSN, PHN

## 2018-06-11 ENCOUNTER — MEDICAL CORRESPONDENCE (OUTPATIENT)
Dept: HEALTH INFORMATION MANAGEMENT | Facility: CLINIC | Age: 73
End: 2018-06-11

## 2018-06-18 ENCOUNTER — MEDICAL CORRESPONDENCE (OUTPATIENT)
Dept: HEALTH INFORMATION MANAGEMENT | Facility: CLINIC | Age: 73
End: 2018-06-18

## 2018-06-21 ENCOUNTER — MEDICAL CORRESPONDENCE (OUTPATIENT)
Dept: HEALTH INFORMATION MANAGEMENT | Facility: CLINIC | Age: 73
End: 2018-06-21

## 2018-07-05 ENCOUNTER — MEDICAL CORRESPONDENCE (OUTPATIENT)
Dept: HEALTH INFORMATION MANAGEMENT | Facility: CLINIC | Age: 73
End: 2018-07-05

## 2018-07-10 DIAGNOSIS — Z53.9 DIAGNOSIS NOT YET DEFINED: Primary | ICD-10-CM

## 2018-07-10 PROCEDURE — G0179 MD RECERTIFICATION HHA PT: HCPCS | Performed by: FAMILY MEDICINE

## 2018-07-26 ENCOUNTER — MEDICAL CORRESPONDENCE (OUTPATIENT)
Dept: HEALTH INFORMATION MANAGEMENT | Facility: CLINIC | Age: 73
End: 2018-07-26

## 2018-08-07 ENCOUNTER — MEDICAL CORRESPONDENCE (OUTPATIENT)
Dept: HEALTH INFORMATION MANAGEMENT | Facility: CLINIC | Age: 73
End: 2018-08-07

## 2018-08-14 ENCOUNTER — MEDICAL CORRESPONDENCE (OUTPATIENT)
Dept: HEALTH INFORMATION MANAGEMENT | Facility: CLINIC | Age: 73
End: 2018-08-14

## 2018-09-10 ENCOUNTER — MEDICAL CORRESPONDENCE (OUTPATIENT)
Dept: HEALTH INFORMATION MANAGEMENT | Facility: CLINIC | Age: 73
End: 2018-09-10

## 2018-09-10 DIAGNOSIS — Z53.9 DIAGNOSIS NOT YET DEFINED: Primary | ICD-10-CM

## 2018-09-10 PROCEDURE — G0179 MD RECERTIFICATION HHA PT: HCPCS | Performed by: FAMILY MEDICINE

## 2018-09-26 ENCOUNTER — MEDICAL CORRESPONDENCE (OUTPATIENT)
Dept: HEALTH INFORMATION MANAGEMENT | Facility: CLINIC | Age: 73
End: 2018-09-26

## 2018-10-15 ENCOUNTER — TELEPHONE (OUTPATIENT)
Dept: FAMILY MEDICINE | Facility: CLINIC | Age: 73
End: 2018-10-15

## 2018-10-15 ENCOUNTER — MEDICAL CORRESPONDENCE (OUTPATIENT)
Dept: HEALTH INFORMATION MANAGEMENT | Facility: CLINIC | Age: 73
End: 2018-10-15

## 2018-10-15 NOTE — TELEPHONE ENCOUNTER
Reason for Call:  Home Health Care        Orders are needed for this patient. Would like order to give flu shot today    PT: na    OT: sanjiv    Skilled Nursing: sanjiv    Pt Provider: Michael    Phone Number Homecare Nurse can be reached at: [phone]    Can we leave a detailed message on this number? YES    Phone number patient can be reached at: Other phone number:  na*    Best Time: today any    Call taken on 10/15/2018 at 8:49 AM by Stacy Kerns

## 2018-10-18 ENCOUNTER — MEDICAL CORRESPONDENCE (OUTPATIENT)
Dept: HEALTH INFORMATION MANAGEMENT | Facility: CLINIC | Age: 73
End: 2018-10-18

## 2018-10-25 ENCOUNTER — MEDICAL CORRESPONDENCE (OUTPATIENT)
Dept: HEALTH INFORMATION MANAGEMENT | Facility: CLINIC | Age: 73
End: 2018-10-25

## 2018-10-26 DIAGNOSIS — I10 ESSENTIAL HYPERTENSION WITH GOAL BLOOD PRESSURE LESS THAN 140/90: ICD-10-CM

## 2018-10-26 RX ORDER — LISINOPRIL 5 MG/1
5 TABLET ORAL DAILY
Qty: 30 TABLET | Refills: 0 | Status: SHIPPED | OUTPATIENT
Start: 2018-10-26 | End: 2018-11-08

## 2018-10-26 RX ORDER — LISINOPRIL 5 MG/1
TABLET ORAL
Qty: 90 TABLET | Refills: 0 | OUTPATIENT
Start: 2018-10-26

## 2018-10-26 NOTE — TELEPHONE ENCOUNTER
Called and verified with pharmacy on duplicate prescription. Please disregard. AURELIANO Dillard

## 2018-11-07 ENCOUNTER — TELEPHONE (OUTPATIENT)
Dept: FAMILY MEDICINE | Facility: CLINIC | Age: 73
End: 2018-11-07

## 2018-11-07 NOTE — TELEPHONE ENCOUNTER
Called and spoke with patient.   Patient and  state that Home Care nurses notified patient today that they will no longer be able to see patient after today due to Medicare reasons stating that patient needs a face-to-face visit and a form needs to be completed to continue home care. .   Pt's last OV was 9/13/2017  Currently have nurses coming out 3x a week to do vitals and wound care.   Patient and  wondering if they can have form filled out without having an appointment due to patient being homebound.   They do have an appt scheduled if unable to accomodate this but patient is adament that she cannot leave house.     Please advise if this form for medicare home care nursing can be filled out without a face-to-face visit.     Maribel Holder RN

## 2018-11-07 NOTE — TELEPHONE ENCOUNTER
Reason for Call:  Other call back    Detailed comments: Would like to speak with Dr. Rodriguez if possible regarding home health care/Medicare and a face to face appointment. Oanh is home bound.    Phone Number Patient can be reached at: Home number on file [phone] (home)    Best Time: ASAP    Can we leave a detailed message on this number? YES    Call taken on 11/7/2018 at 2:46 PM by Leslie Lance

## 2018-11-07 NOTE — TELEPHONE ENCOUNTER
LM for patient and  that they do need to keep the appointment for tomorrow with Dr. Rodriguez at 10:40 a.m., in order for her to continue receiving home care. Galilea Zaldivar,

## 2018-11-08 ENCOUNTER — OFFICE VISIT (OUTPATIENT)
Dept: FAMILY MEDICINE | Facility: CLINIC | Age: 73
End: 2018-11-08
Payer: COMMERCIAL

## 2018-11-08 ENCOUNTER — PATIENT OUTREACH (OUTPATIENT)
Dept: CARE COORDINATION | Facility: CLINIC | Age: 73
End: 2018-11-08

## 2018-11-08 VITALS
SYSTOLIC BLOOD PRESSURE: 120 MMHG | HEART RATE: 88 BPM | TEMPERATURE: 97.7 F | OXYGEN SATURATION: 97 % | DIASTOLIC BLOOD PRESSURE: 70 MMHG

## 2018-11-08 DIAGNOSIS — Z79.899 HIGH RISK MEDICATION USE: ICD-10-CM

## 2018-11-08 DIAGNOSIS — M19.90 OSTEOARTHRITIS, UNSPECIFIED OSTEOARTHRITIS TYPE, UNSPECIFIED SITE: ICD-10-CM

## 2018-11-08 DIAGNOSIS — M79.7 FIBROMYALGIA: ICD-10-CM

## 2018-11-08 DIAGNOSIS — M32.9 SYSTEMIC LUPUS ERYTHEMATOSUS, UNSPECIFIED SLE TYPE, UNSPECIFIED ORGAN INVOLVEMENT STATUS (H): Primary | ICD-10-CM

## 2018-11-08 DIAGNOSIS — I77.6 VASCULITIS (H): ICD-10-CM

## 2018-11-08 DIAGNOSIS — R73.02 IMPAIRED GLUCOSE TOLERANCE: ICD-10-CM

## 2018-11-08 DIAGNOSIS — I10 ESSENTIAL HYPERTENSION WITH GOAL BLOOD PRESSURE LESS THAN 140/90: ICD-10-CM

## 2018-11-08 DIAGNOSIS — I10 HYPERTENSION GOAL BP (BLOOD PRESSURE) < 140/90: ICD-10-CM

## 2018-11-08 DIAGNOSIS — M81.0 OSTEOPOROSIS, UNSPECIFIED OSTEOPOROSIS TYPE, UNSPECIFIED PATHOLOGICAL FRACTURE PRESENCE: ICD-10-CM

## 2018-11-08 DIAGNOSIS — K52.832 LYMPHOCYTIC COLITIS: ICD-10-CM

## 2018-11-08 DIAGNOSIS — E78.5 HYPERLIPIDEMIA LDL GOAL <130: ICD-10-CM

## 2018-11-08 LAB
CHOLEST SERPL-MCNC: 146 MG/DL
HDLC SERPL-MCNC: 54 MG/DL
LDLC SERPL CALC-MCNC: 58 MG/DL
NONHDLC SERPL-MCNC: 92 MG/DL
TRIGL SERPL-MCNC: 171 MG/DL

## 2018-11-08 PROCEDURE — 99214 OFFICE O/P EST MOD 30 MIN: CPT | Performed by: FAMILY MEDICINE

## 2018-11-08 PROCEDURE — 80061 LIPID PANEL: CPT | Performed by: FAMILY MEDICINE

## 2018-11-08 PROCEDURE — 36415 COLL VENOUS BLD VENIPUNCTURE: CPT | Performed by: FAMILY MEDICINE

## 2018-11-08 RX ORDER — ATORVASTATIN CALCIUM 20 MG/1
20 TABLET, FILM COATED ORAL DAILY
Qty: 90 TABLET | Refills: 3 | Status: SHIPPED | OUTPATIENT
Start: 2018-11-08 | End: 2018-11-09

## 2018-11-08 RX ORDER — PREDNISONE 5 MG/1
10 TABLET ORAL DAILY
COMMUNITY
Start: 2018-11-08

## 2018-11-08 RX ORDER — LISINOPRIL 5 MG/1
5 TABLET ORAL DAILY
Qty: 90 TABLET | Refills: 3 | Status: SHIPPED | OUTPATIENT
Start: 2018-11-08 | End: 2019-09-13

## 2018-11-08 RX ORDER — BUDESONIDE 3 MG/1
3 CAPSULE, COATED PELLETS ORAL 2 TIMES DAILY
COMMUNITY
Start: 2018-11-08 | End: 2020-03-02

## 2018-11-08 ASSESSMENT — PAIN SCALES - GENERAL: PAINLEVEL: MODERATE PAIN (5)

## 2018-11-08 ASSESSMENT — ACTIVITIES OF DAILY LIVING (ADL)
DEPENDENT_IADLS:: CLEANING;COOKING;LAUNDRY;SHOPPING;MEAL PREPARATION;MEDICATION MANAGEMENT;MONEY MANAGEMENT;TRANSPORTATION

## 2018-11-08 NOTE — TELEPHONE ENCOUNTER
Patient was seen today. Physician Claim Form for [employer] completed by Dr. Rodriguez and faxed to [phone].  Galilea Zaldivar,

## 2018-11-08 NOTE — MR AVS SNAPSHOT
After Visit Summary   11/8/2018    Marlys A Tietz    MRN: [id number]           Patient Information     Date Of Birth          1945        Visit Information        Provider Department      11/8/2018 10:40 AM Ella Rodriguez MD [facility]        Today's Diagnoses     High risk medication use    -  1    Impaired glucose tolerance        Hypertension goal BP (blood pressure) < 140/90        Vasculitis (H)        Fibromyalgia        Osteoporosis, unspecified osteoporosis type, unspecified pathological fracture presence        Osteoarthritis, unspecified osteoarthritis type, unspecified site        Systemic lupus erythematosus, unspecified SLE type, unspecified organ involvement status (H)        Hyperlipidemia LDL goal <130        Essential hypertension with goal blood pressure less than 140/90        Lymphocytic colitis           Follow-ups after your visit        Additional Services     CARE COORDINATION REFERRAL       Services are provided by a Care Coordinator for people with complex needs such as: medical, social, or financial troubles.  The Care Coordinator works with the patient and their Primary Care Provider to determine health goals, obtain resources, achieve outcomes, and develop care plans that help coordinate the patient's care.     Reason for Referral: Complex Medical Concerns/Education: Chronic diagnosis     Additional pertinent details:      Clinical Staff have discussed the Care Coordination Referral with the patient and/or caregiver: yes                  Who to contact     If you have questions or need follow up information about today's clinic visit or your schedule please contact [facility] directly at [phone].  Normal or non-critical lab and imaging results will be communicated to you by MyChart, letter or phone within 4 business days after the clinic has received the results. If you do not hear from us within 7 days, please contact the clinic through  [facility] or phone. If you have a critical or abnormal lab result, we will notify you by phone as soon as possible.  Submit refill requests through [employer] or call your pharmacy and they will forward the refill request to us. Please allow 3 business days for your refill to be completed.          Additional Information About Your Visit        [email or web address] Information     [facility] gives you secure access to your electronic health record. If you see a primary care provider, you can also send messages to your care team and make appointments. If you have questions, please call your primary care clinic.  If you do not have a primary care provider, please call [phone] and they will assist you.        Care EveryWhere ID     This is your Care EveryWhere ID. This could be used by other organizations to access your [facility] medical records  [id number]        Your Vitals Were     Pulse Temperature Pulse Oximetry             88 97.7  F (36.5  C) (Oral) 97%          Blood Pressure from Last 3 Encounters:   11/08/18 120/70   09/13/17 124/72   06/22/17 107/63    Weight from Last 3 Encounters:   03/03/17 139 lb (63 kg)   02/19/17 141 lb 4.8 oz (64.1 kg)   01/03/17 139 lb (63 kg)              We Performed the Following     CARE COORDINATION REFERRAL     Lipid panel reflex to direct LDL Fasting          Today's Medication Changes          These changes are accurate as of 11/8/18 11:32 AM.  If you have any questions, ask your nurse or doctor.               These medicines have changed or have updated prescriptions.        Dose/Directions    * atorvastatin 20 MG tablet   Commonly known as:  LIPITOR   This may have changed:  Another medication with the same name was changed. Make sure you understand how and when to take each.   Used for:  Hyperlipidemia LDL goal <130   Changed by:  Ella Rodriguez MD        TAKE 1 TABLET BY MOUTH EVERY DAY   Quantity:  30 tablet   Refills:  8       * atorvastatin 20 MG tablet   Commonly known as:  LIPITOR    This may have changed:  See the new instructions.   Used for:  Hyperlipidemia LDL goal <130   Changed by:  Ella Rodriguez MD        Dose:  20 mg   Take 1 tablet (20 mg) by mouth daily   Quantity:  90 tablet   Refills:  3       lisinopril 5 MG tablet   Commonly known as:  PRINIVIL/ZESTRIL   This may have changed:  additional instructions   Used for:  Essential hypertension with goal blood pressure less than 140/90   Changed by:  Ella Rodriguez MD        Dose:  5 mg   Take 1 tablet (5 mg) by mouth daily   Quantity:  90 tablet   Refills:  3       predniSONE 5 MG tablet   Commonly known as:  DELTASONE   This may have changed:  Another medication with the same name was removed. Continue taking this medication, and follow the directions you see here.   Used for:  Hyperlipidemia LDL goal <130   Changed by:  Ella Rodriguez MD        Dose:  5 mg   Take 1 tablet (5 mg) by mouth daily   Refills:  0       * Notice:  This list has 2 medication(s) that are the same as other medications prescribed for you. Read the directions carefully, and ask your doctor or other care provider to review them with you.      Stop taking these medicines if you haven't already. Please contact your care team if you have questions.     BONIVA 3 MG/3ML   Generic drug:  ibandronate   Stopped by:  Ella Rodriguez MD                Where to get your medicines      These medications were sent to [id number] - [place], [place] - [address] AT [facility] [address]  [address], [place] [address]     Phone:  [phone]     atorvastatin 20 MG tablet    lisinopril 5 MG tablet                Primary Care Provider Office Phone # Fax #    Ella Rodriguez [phone] [address] [facility] [address]        Equal Access to Services     OTTO BROCK AH: Hadii candie Reynoso, waaxda luqadaha, qaybta kaalmada senaityatomeka, cal castillo. So [facility]  [phone].    ATENCIÓN: Si chanel warner, tiene a tate disposición servicios gratuitos de asistencia lingüística. Kong lord [phone].    We comply with applicable federal civil rights laws and [place] laws. We do not discriminate on the basis of race, color, national origin, age, disability, sex, sexual orientation, or gender identity.            Thank you!     Thank you for choosing [facility]  for your care. Our goal is always to provide you with excellent care. Hearing back from our patients is one way we can continue to improve our services. Please take a few minutes to complete the written survey that you may receive in the mail after your visit with us. Thank you!             Your Updated Medication List - Protect others around you: Learn how to safely use, store and throw away your medicines at www.disposemymeds.org.          This list is accurate as of 11/8/18 11:32 AM.  Always use your most recent med list.                   Brand Name Dispense Instructions for use Diagnosis    aspirin 81 MG tablet      Take  by mouth daily.        * atorvastatin 20 MG tablet    LIPITOR    30 tablet    TAKE 1 TABLET BY MOUTH EVERY DAY    Hyperlipidemia LDL goal <130       * atorvastatin 20 MG tablet    LIPITOR    90 tablet    Take 1 tablet (20 mg) by mouth daily    Hyperlipidemia LDL goal <130       azaTHIOprine 50 MG tablet    IMURAN     Take 50 mg by mouth daily.        * budesonide 3 MG 24 hr capsule    ENTOCORT EC     Take 3 mg by mouth 2 times daily.        * budesonide 3 MG EC capsule    ENTOCORT EC     Take 1 capsule (3 mg) by mouth 2 times daily    Lymphocytic colitis       CALTRATE 600+D PO      TWO TAB DAILY        cephALEXin 500 MG capsule    KEFLEX    4 capsule    2 grams  Half hour before Procedure    Need for subacute bacterial endocarditis prophylaxis       * FORTEO 600 MCG/2.4ML Soln injection   Generic drug:  teriparatide (recombinant)      Inject 0.08 mLs (20 mcg) Subcutaneous daily         * FORTEO 600 MCG/2.4ML Soln injection   Generic drug:  teriparatide (recombinant)      Inject 0.08 mLs (20 mcg) Subcutaneous daily    Systemic lupus erythematosus, unspecified SLE type, unspecified organ involvement status (H)       HYDROcodone-acetaminophen 5-325 MG per tablet    NORCO    90 tablet    Take 1 tablet by mouth every 6 hours as needed for moderate to severe pain maximum 4 tablet(s) per day    Compression fracture of L1 lumbar vertebra, with routine healing, subsequent encounter       lisinopril 5 MG tablet    PRINIVIL/ZESTRIL    90 tablet    Take 1 tablet (5 mg) by mouth daily    Essential hypertension with goal blood pressure less than 140/90       MULTIVITAMIN TABS   OR      1 TABLET DAILY        OMEPRAZOLE PO      Take 10 mg by mouth daily        predniSONE 5 MG tablet    DELTASONE     Take 1 tablet (5 mg) by mouth daily    Hyperlipidemia LDL goal <130       tretinoin 0.1 % cream    RETIN-A          TYLENOL EXTRA STRENGTH 500 MG Tabs      1 TABLET EVERY 4 HOURS AS NEEDED        VITAMIN B 12 PO      Take 1,000 mcg by mouth daily        * Notice:  This list has 6 medication(s) that are the same as other medications prescribed for you. Read the directions carefully, and ask your doctor or other care provider to review them with you.

## 2018-11-08 NOTE — PROGRESS NOTES
SUBJECTIVE:   Marlys A Tietz is a 73 year old female who presents to clinic today for the following health issues:      Chief Complaint   Patient presents with     Orders     face to face for home care order.         Hypertension Follow-up      Outpatient blood pressures are not being checked.    Low Salt Diet: no added salt    Pt is on High risk meds due to her Vasculitis/[id number] sees Rheumatology    She does not leave her Home   And has Home care    Due to osteoporosis she has had several fractures which are now healed.  Patient has chronic pain and rarely leaves her bed.  Her  takes care of of her.  She also has home care.    She is wondering if there are any doctors to make home visits social as it is really hard for her to come to the clinic for appointments.  Problem list and histories reviewed & adjusted, as indicated.  Hyperlipidemia Follow-Up      Rate your low fat/cholesterol diet?: good    Taking statin?  Yes, no muscle aches from statin    Other lipid medications/supplements?:  none      Amount of exercise or physical activity:  Walks some with a walker    Problems taking medications regularly: No    Medication side effects: none    Diet: regular (no restrictions)        Additional history: as documented    Patient Active Problem List   Diagnosis     HYPERLIPIDEMIA LDL GOAL <130     Vasculitis (H)     Fibromyalgia     Osteoporosis     Osteoarthritis     Systemic lupus erythematosus (H)     Advanced directives, counseling/discussion     Lymphocytic colitis     Hypertension goal BP (blood pressure) < 140/90     Impaired glucose tolerance     Diverticulitis     Diverticulitis of colon     Tear of right rotator cuff     Tear of left rotator cuff     Hip abductor tear     H/O bacterial endocarditis     H/O endocarditis     High risk medication use     Past Surgical History:   Procedure Laterality Date     NO HISTORY OF SURGERY         Social History   Substance Use Topics     Smoking status: Never  Smoker     Smokeless tobacco: Never Used     Alcohol use 0.5 oz/week     1 Standard drinks or equivalent per week     Family History   Problem Relation Age of Onset     Hypertension Mother      Osteoporosis Mother          Current Outpatient Prescriptions   Medication Sig Dispense Refill     aspirin 81 MG tablet Take  by mouth daily.       atorvastatin (LIPITOR) 20 MG tablet Take 1 tablet (20 mg) by mouth daily 90 tablet 3     azathioprine (IMURAN) 50 MG tablet Take 50 mg by mouth daily.       budesonide (ENTOCORT EC) 3 MG 24 hr capsule Take 3 mg by mouth 2 times daily.       budesonide (ENTOCORT EC) 3 MG EC capsule Take 1 capsule (3 mg) by mouth 2 times daily       CALTRATE 600+D OR TWO TAB DAILY       Cyanocobalamin (VITAMIN B 12 PO) Take 1,000 mcg by mouth daily       lisinopril (PRINIVIL/ZESTRIL) 5 MG tablet Take 1 tablet (5 mg) by mouth daily 90 tablet 3     OMEPRAZOLE PO Take 10 mg by mouth daily       predniSONE (DELTASONE) 5 MG tablet Take 1 tablet (5 mg) by mouth daily       teriparatide, recombinant, (FORTEO) 600 MCG/2.4ML SOLN injection Inject 0.08 mLs (20 mcg) Subcutaneous daily       teriparatide, recombinant, (FORTEO) 600 MCG/2.4ML SOLN injection Inject 0.08 mLs (20 mcg) Subcutaneous daily       TYLENOL EXTRA STRENGTH 500 MG OR TABS 1 TABLET EVERY 4 HOURS AS NEEDED       atorvastatin (LIPITOR) 20 MG tablet TAKE 1 TABLET BY MOUTH EVERY DAY (Patient not taking: Reported on 11/8/2018) 30 tablet 8     cephALEXin (KEFLEX) 500 MG capsule 2 grams  Half hour before Procedure (Patient not taking: Reported on 11/8/2018) 4 capsule 3     HYDROcodone-acetaminophen (NORCO) 5-325 MG per tablet Take 1 tablet by mouth every 6 hours as needed for moderate to severe pain maximum 4 tablet(s) per day (Patient not taking: Reported on 9/13/2017) 90 tablet 0     MULTIVITAMIN TABS   OR 1 TABLET DAILY       tretinoin (RETIN-A) 0.1 % cream        [DISCONTINUED] atorvastatin (LIPITOR) 20 MG tablet TAKE ONE TABLET BY MOUTH ONCE  DAILY 30 tablet 0     [DISCONTINUED] lisinopril (PRINIVIL/ZESTRIL) 5 MG tablet Take 1 tablet (5 mg) by mouth daily Need to see MD for further refills 30 tablet 0     Allergies   Allergen Reactions     Penicillins      Reaction unknown     Sulfa Drugs      Was told by mother     Recent Labs   Lab Test  09/13/17   1530 06/08/17 01/03/17   1115  09/23/16   1156  08/15/15  07/22/15   1650  02/18/15   1036   02/20/14   0942   A1C  5.8   --   5.9   --    --    --    --    --    --    --    LDL   --    --   69  102*   --    --    --   76   --   95   HDL   --    --   104  86   --    --    --   102   --   97   TRIG   --    --   185*  167*   --    --    --   183*   --   98   ALT   --    --   32   --    --   20  25  30   --   29   CR  0.55  0.7   --   0.79   < >  1.0  0.87  0.76   < >  0.79   GFRESTIMATED  >90   --    --   72   --   58*  64  76   < >  72   GFRESTBLACK  >90   --    --   87   --   >60  77  >90   GFR Calc     < >  87   POTASSIUM  3.4  4.0   --   4.4   < >  4.4  4.5  4.3   < >  3.9   TSH   --    --    --    --    --    --    --    --    --   1.82    < > = values in this interval not displayed.      BP Readings from Last 3 Encounters:   11/08/18 120/70   09/13/17 124/72   06/22/17 107/63    Wt Readings from Last 3 Encounters:   03/03/17 139 lb (63 kg)   02/19/17 141 lb 4.8 oz (64.1 kg)   01/03/17 139 lb (63 kg)                  Labs reviewed in EPIC    Reviewed and updated as needed this visit by clinical staff  Allergies       Reviewed and updated as needed this visit by Provider         ROS:  CONSTITUTIONAL: NEGATIVE for fever, chills, change in weight  ENT/MOUTH: NEGATIVE for ear, mouth and throat problems  RESP: NEGATIVE for significant cough or SOB  CV: NEGATIVE for chest pain, palpitations or peripheral edema  GI: NEGATIVE for nausea, abdominal pain, heartburn, or change in bowel habits  MUSCULOSKELETAL: has check pain  ENDOCRINE: NEGATIVE for temperature intolerance, skin/hair  changes  PSYCHIATRIC: NEGATIVE for changes in mood or affect    OBJECTIVE:     /70  Pulse 88  Temp 97.7  F (36.5  C) (Oral)  SpO2 97%  There is no height or weight on file to calculate BMI.  GENERAL: alert, frail, elderly and in a wheelchair-uses walker at Home  EYES: Eyes grossly normal to inspection, PERRL and conjunctivae and sclerae normal  NECK: no adenopathy, no asymmetry, masses, or scars and thyroid normal to palpation  RESP: lungs clear to auscultation - no rales, rhonchi or wheezes  CV: regular rate and rhythm, normal S1 S2, no S3 or S4, no murmur, click or rub, no peripheral edema and peripheral pulses strong  ABDOMEN: soft, nontender, no hepatosplenomegaly, no masses and bowel sounds normal  MS: arthritis  SKIN: no suspicious lesions or rashes  PSYCH: mentation appears normal    Diagnostic Test Results:  Pending   BMP done at [place]    ASSESSMENT/PLAN:           1. Systemic lupus erythematosus, unspecified SLE type, unspecified organ involvement status (H)    2. High risk medication use  Sees specialist  - CARE COORDINATION REFERRAL    3. Vasculitis (H)  Sees specialist  - CARE COORDINATION REFERRAL    4. Impaired glucose tolerance      5. Hypertension goal BP (blood pressure) < 140/90  controlled  - CARE COORDINATION REFERRAL    6. Fibromyalgia    - CARE COORDINATION REFERRAL    7. Osteoporosis, unspecified osteoporosis type, unspecified pathological fracture presence  On Forteo    8. Osteoarthritis, unspecified osteoarthritis type, unspecified site  Homebound  - CARE COORDINATION REFERRAL    9. Hyperlipidemia LDL goal <130  Labs pending   Refilled  - atorvastatin (LIPITOR) 20 MG tablet; Take 1 tablet (20 mg) by mouth daily  Dispense: 90 tablet; Refill: 3  - Lipid panel reflex to direct LDL Fasting    10. Essential hypertension with goal blood pressure less than 140/90  Stable       Labs reviewed [place]  Pt has had her flu shot  - lisinopril (PRINIVIL/ZESTRIL) 5 MG tablet; Take 1 tablet (5 mg) by  mouth daily  Dispense: 90 tablet; Refill: 3    11. Lymphocytic colitis    - budesonide (ENTOCORT EC) 3 MG EC capsule; Take 1 capsule (3 mg) by mouth 2 times daily  - CARE COORDINATION REFERRAL  Follow up 6 months  Care coordination referral to see if there are any Physicians  who do Home visits  Ella Rodriguez MD  [facility]

## 2018-11-08 NOTE — PROGRESS NOTES
Clinic Care Coordination Contact  Care Team Conversations    Clinic care coordinator received initial referral from PCP.  Patient is bed bound with spouse as her primary caregiver.  It is very difficult to get patient into the clinic for PCP visits.  PCP is questioning if there are any physicians that make home visits. Unfortunately, there are no agencies that provided this services.     Clinic care coordinator called patient home care , Olya, with [facility].  Requested return call to evaluate home environment  And determine if there are additional needs.     Clinic care coordinator will await return call from Olya.    Clinic care coordinator will follow up with family after discussion with home care.     Sarah Camacho RN, CCM - Care Coordinator     11/8/2018    3:09 PM  [phone]   ___________________________    Clinic care coordinator received call back from home care , Olya ([phone]).    Olya states that patient is very stubborn and only does what she wants to do. Patient is able to ambulate with a walker independently. She gets up and using the bathroom independently. Patient lives on the second floor of the home. Her spouse brings her meals. Olya states they have offered to set up a bed or even a hospital bed on the first floor for her but she refuses. She spends the majority of her time in bed by her choice.    Olya states they have been trying to get patient to go to wound clinic for debridement of her wounds on her lower legs. Patient refuses. They have had wound care nurse see her and they have tired multiple treamtent options but wounds need debriding in order to heal.    Patient does have history of falling down the stairs in the past. So she refuses to use the stairs and lives on the second floor.    Olya states that spouse is looking at assisted living as he also has health issues and realizes that they are not going to be able to stay in the home.  Patient is not open to the idea.     Patient receives skilled nursing services three times per week and home health aide for bathing twice a week.     Clinic care coordinator will continue to follow and collaborate with Home care.     Sarah Camacho RN, CCM - Care Coordinator     11/8/2018    4:10 PM  [phone]

## 2018-11-09 ENCOUNTER — TELEPHONE (OUTPATIENT)
Dept: FAMILY MEDICINE | Facility: CLINIC | Age: 73
End: 2018-11-09

## 2018-11-09 DIAGNOSIS — Z53.9 DIAGNOSIS NOT YET DEFINED: Primary | ICD-10-CM

## 2018-11-09 PROCEDURE — G0179 MD RECERTIFICATION HHA PT: HCPCS | Performed by: FAMILY MEDICINE

## 2018-11-09 RX ORDER — ATORVASTATIN CALCIUM 40 MG/1
40 TABLET, FILM COATED ORAL DAILY
Qty: 90 TABLET | Refills: 3 | Status: SHIPPED | OUTPATIENT
Start: 2018-11-09 | End: 2019-12-09

## 2018-11-09 NOTE — TELEPHONE ENCOUNTER
Reason for Call:  Request for results:    Name of test or procedure: Labs    Date of test of procedure: 10/08    Location of the test or procedure: [place]    OK to leave the result message on voice mail or with a family member? YES    Phone number Patient can be reached at:  Home number on file [phone] (home)    Additional comments: Patient is calling about lab results/cholesterol    Call taken on 11/9/2018 at 1:01 PM by Leslie Lance

## 2018-11-09 NOTE — TELEPHONE ENCOUNTER
Called and spoke to patient and gave results.   Pt wondering what to do to help triglycerides, gave information regarding healthy diet and exercise.   Verbalized understanding & no further questions.     Maribel Holder RN

## 2018-11-13 ENCOUNTER — MEDICAL CORRESPONDENCE (OUTPATIENT)
Dept: HEALTH INFORMATION MANAGEMENT | Facility: CLINIC | Age: 73
End: 2018-11-13

## 2018-12-07 ENCOUNTER — MEDICAL CORRESPONDENCE (OUTPATIENT)
Dept: HEALTH INFORMATION MANAGEMENT | Facility: CLINIC | Age: 73
End: 2018-12-07

## 2018-12-10 ENCOUNTER — PATIENT OUTREACH (OUTPATIENT)
Dept: CARE COORDINATION | Facility: CLINIC | Age: 73
End: 2018-12-10

## 2018-12-10 NOTE — PROGRESS NOTES
Clinic Care Coordination Contact  Care Team Conversations    Per , Olya, for [facility] care.     Patient continues to receive skilled nursing visits for wound care three times per week with home health aide visits for bathing twice a week.     Patient remains at her baseline.     Clinic care coordinator will continue to follow and collaborate with home care.     Sarah Camacho RN, CCM - Care Coordinator     12/10/2018    2:34 PM  [phone]

## 2018-12-11 ENCOUNTER — TELEPHONE (OUTPATIENT)
Dept: FAMILY MEDICINE | Facility: CLINIC | Age: 73
End: 2018-12-11

## 2018-12-11 NOTE — TELEPHONE ENCOUNTER
[phone] fax number    Last office note and med list faxed to the above number. Jia Mejia,

## 2018-12-11 NOTE — TELEPHONE ENCOUNTER
Reason for call:  Other     Patient called regarding (reason for call): call back    Additional comments: [facility] is calling wanting patient notes and med list sent over for this patient. Please call back    Phone number to reach patient:  Other phone number:  [phone]    Best Time:  any    Can we leave a detailed message on this number?  YES

## 2018-12-12 ENCOUNTER — TELEPHONE (OUTPATIENT)
Dept: FAMILY MEDICINE | Facility: CLINIC | Age: 73
End: 2018-12-12

## 2018-12-12 NOTE — TELEPHONE ENCOUNTER
Reason for Call: Request for an order or referral:    Order or referral being requested: RN start of care for today.     Date needed: as soon as possible    Has the patient been seen by the PCP for this problem? YES    Additional comments: Patient and son fired other RN agency and need to start care.     Phone number Patient can be reached at:  Other phone number: [phone]     Best Time:  Any     Can we leave a detailed message on this number?  YES    Call taken on 12/12/2018 at 9:44 AM by Ruddy John

## 2018-12-12 NOTE — TELEPHONE ENCOUNTER
Reason for call:  Other     Patient called regarding (reason for call): call back    Additional comments: [facility] is calling to give orders. Skilled nursing 2 times a week for 9 weeks. 2 PRN visits. OT and PT eval and treat. Home health aide 2 times a week for 9 weeks. Patient is on Lipator 40 mil once daily, tretinal cream 1 application daily and calcium citrate 1000 ml 2 tablespoons daily. Discontinue norco, calcium carbinate and multi vitamin.    Phone number to reach patient:  Other phone number:  [phone]    Best Time:  any    Can we leave a detailed message on this number?  YES

## 2018-12-13 ENCOUNTER — TELEPHONE (OUTPATIENT)
Dept: FAMILY MEDICINE | Facility: CLINIC | Age: 73
End: 2018-12-13

## 2018-12-13 NOTE — TELEPHONE ENCOUNTER
Reason for call:  Orders for wound care  Patient called regarding (reason for call): Home care looking for orders  Additional comments: Please call regarding wound care orders    Phone number to reach patient:  Other phone number:  [phone]*    Best Time:  8:30 am to 5 pm    Can we leave a detailed message on this number?  YES

## 2018-12-13 NOTE — TELEPHONE ENCOUNTER
Spoke with Nidia. Gave verbal ok for orders requested per RN protocol. Medication list updated.    Nidia would like to know if Dr. Rodriguez would be ok if [place] [facility] goes out to pt's home to evaluate her vasculitis in her legs. Wants to make sure they are on track with the treatment. Please advise.     Kriss Segovia RN  [facility]

## 2018-12-13 NOTE — TELEPHONE ENCOUNTER
Called Kelly. She was wondering if we had any order information for wound care for her wounds on her right lower extremity. Could not find any recent orders for right lower extremity. Pt previously was seen by [facility] and they had wound care listed for her LLE, not RLE. She will contact pt's other clinic to see if they have orders.    Kriss Segovia RN  [facility]

## 2018-12-17 ENCOUNTER — TELEPHONE (OUTPATIENT)
Dept: FAMILY MEDICINE | Facility: CLINIC | Age: 73
End: 2018-12-17

## 2018-12-17 DIAGNOSIS — I83.008 VENOUS STASIS ULCER OF OTHER PART OF LOWER LEG, UNSPECIFIED LATERALITY, UNSPECIFIED ULCER STAGE, UNSPECIFIED WHETHER VARICOSE VEINS PRESENT (H): Primary | ICD-10-CM

## 2018-12-17 DIAGNOSIS — L97.809 VENOUS STASIS ULCER OF OTHER PART OF LOWER LEG, UNSPECIFIED LATERALITY, UNSPECIFIED ULCER STAGE, UNSPECIFIED WHETHER VARICOSE VEINS PRESENT (H): Primary | ICD-10-CM

## 2018-12-17 NOTE — TELEPHONE ENCOUNTER
Called and spoke with Larry and gave verbal OK for OT.   Verbalized understanding.    Called and spoke with Latha and gave information per Dr. Rodriguez for [facility] nurse to see patient.   Verbalized understanding & no further questions.     Maribel Holder RN

## 2018-12-17 NOTE — TELEPHONE ENCOUNTER
Larry calling to get an order for home o/t. Please call with ok.     For range of motion rehab. adl training. 2 times per week for 1 week. Then 1 time per week for one week. Then two times per week for 3 weeks.     Ok to leave a message on larry phone.

## 2018-12-17 NOTE — TELEPHONE ENCOUNTER
Reason for call:  Order   Order or referral being requested: PT 1 week 1 week, 2 times a week for 2 weeks, 1 time a week for 1 week  Reason for request: Diagnoses code for Vickie-PT seeing her for falls and weakness  Date needed: 12/15/2018  Has the patient been seen by the PCP for this problem? YES    Additional comments: need verbal order for follow up and duration    Phone number to reach patient:  [phone]  Best Time:  anytime    Can we leave a detailed message on this number?  YES

## 2018-12-17 NOTE — TELEPHONE ENCOUNTER
Patient already has home care nurse.   They just need specific orders for wound care on how to care for it/how often.     Maribel Holder RN

## 2018-12-17 NOTE — TELEPHONE ENCOUNTER
Left detailed message for Evie/PT with verbal OK for orders below.  Advised to call RN hotline [phone] if any questions.     Maribel Holder RN

## 2018-12-17 NOTE — TELEPHONE ENCOUNTER
Reason for call:  Home Healthcare Reason for Call:  Home Health Care      Orders are needed for this patient. Fax over wound care orders    PT: sanjiv    OT: sanjiv    Skilled Nursing: sanjiv    Pt Provider: Michael    Phone Number Homecare Nurse can be reached at: [phone]    Can we leave a detailed message on this number? YES    Phone number patient can be reached at: na    Best Time: before 5 pm    Call taken on 12/17/2018 at 9:28 AM by Stacy Kerns

## 2018-12-17 NOTE — TELEPHONE ENCOUNTER
Called and spoke with Elif from [facility] .   Reports patient has 2 venous statis ulcers on R leg.   Each ulcer measure 1.5 cm x 2.5 cm  Reports both ulcers have hard slough in center of wound bed with blackish scabbing and need debridement.  Denies any drainage and no ulcers on L leg.     HC is requesting specific wound care orders for this.    Please advise.     Maribel Holder RN

## 2019-01-02 ENCOUNTER — MEDICAL CORRESPONDENCE (OUTPATIENT)
Dept: HEALTH INFORMATION MANAGEMENT | Facility: CLINIC | Age: 74
End: 2019-01-02

## 2019-01-10 ENCOUNTER — MEDICAL CORRESPONDENCE (OUTPATIENT)
Dept: HEALTH INFORMATION MANAGEMENT | Facility: CLINIC | Age: 74
End: 2019-01-10

## 2019-01-12 ENCOUNTER — TELEPHONE (OUTPATIENT)
Dept: FAMILY MEDICINE | Facility: CLINIC | Age: 74
End: 2019-01-12

## 2019-01-14 NOTE — TELEPHONE ENCOUNTER
Spoke to pharmacy and informed them of below message.  Kriss COVARRUBIAS CMA ([facility])

## 2019-01-14 NOTE — TELEPHONE ENCOUNTER
Dr. Rodriguez did not prescribe this  This is the 2nd request we have received (see 12/30/18 refill request)    Med was prescribed by Shama Alamo MD (Rheumatologist with [facility])?  Please ask pharmacy to send request to the original prescribing provider    Driss Schaffer RN

## 2019-01-15 ENCOUNTER — PATIENT OUTREACH (OUTPATIENT)
Dept: CARE COORDINATION | Facility: CLINIC | Age: 74
End: 2019-01-15

## 2019-01-15 NOTE — PROGRESS NOTES
Clinic Care Coordination Contact  Care Team Conversations    Per [facility]:     Patient was dissatisfied with services from home care agency and requested discharge.      Home care had followed for over 6 months for wound care. Patient consistently refused to make an appointment at the wound clinic for debridement for nonhealing wounds despite extensive education and wound care nurse recommendations.     Patient lives on the upper level of her home. Spouse brings all meals to her and provides cares.     Will close to clinic care coordination at this time. Will re-open as necessary and when patient is willing.     Sarah Camacho RN, CCM - Care Coordinator     1/15/2019    2:29 PM  [phone]

## 2019-01-22 ENCOUNTER — MEDICAL CORRESPONDENCE (OUTPATIENT)
Dept: HEALTH INFORMATION MANAGEMENT | Facility: CLINIC | Age: 74
End: 2019-01-22

## 2019-01-28 ENCOUNTER — TRANSFERRED RECORDS (OUTPATIENT)
Dept: HEALTH INFORMATION MANAGEMENT | Facility: CLINIC | Age: 74
End: 2019-01-28

## 2019-01-28 LAB
ALT SERPL-CCNC: 13 U/L (ref 9–55)
CREAT SERPL-MCNC: 0.63 MG/DL (ref 0.55–1.02)
GFR SERPL CREATININE-BSD FRML MDRD: >60 ML/MIN/1.73M2

## 2019-02-07 ENCOUNTER — TELEPHONE (OUTPATIENT)
Dept: FAMILY MEDICINE | Facility: CLINIC | Age: 74
End: 2019-02-07

## 2019-02-07 NOTE — TELEPHONE ENCOUNTER
Reason for call:  Other   Patient called regarding (reason for call): orders  Additional comments: Nidia from [facility] is calling with orders for skilled nursing 2 times a week for 3 weeks, 2 PRN visits and home health aide for 2 times a week for 3 weeks.     Phone number to reach patient:  Other phone number:  [phone]    Best Time:  any    Can we leave a detailed message on this number?  YES

## 2019-02-27 ENCOUNTER — TELEPHONE (OUTPATIENT)
Dept: FAMILY MEDICINE | Facility: CLINIC | Age: 74
End: 2019-02-27

## 2019-02-27 NOTE — TELEPHONE ENCOUNTER
"Spoke with patient and  who reports \"a lot has happened in the last week\".  Reports that she is being discharged from [facility] by Director of Nursing due to lower extremity wounds being stable and no longer qualifies for skilled nursing to manage leg wounds.   HC will be completed as of Friday, March 1st, 2019 and are being discharged from medicare but long term care will continue- states they are making an appeal to change discharge from medicare but in the mean time are requesting a new HC company to come out and care for leg wounds as they're worried about wounds becoming bad again.   Patient is requesting new orders for home care through a different company or to speak with care coordination as patient is home bound and requesting nursing care for wounds  Last OV 11/8/2018.    Routing to Care Coordination to possible reopen case and discuss with patient.   Please advise.     Maribel Holder RN          "

## 2019-02-27 NOTE — TELEPHONE ENCOUNTER
Reason for call:  Other   Patient called regarding (reason for call): call back  Additional comments: Patient states home health care was discussed at her last visit and she is interested in pursuing. Please touch base with patient to discuss.    Phone number to reach patient:  Home number on file [phone] (home)    Best Time:  ASAP    Can we leave a detailed message on this number?  YES

## 2019-02-28 NOTE — TELEPHONE ENCOUNTER
Called patient and left VM to call clinic to schedule appointment with provider. Please help schedule appointment if patient returns call.  Kriss COVARRUBIAS CMA ([facility])

## 2019-03-01 ENCOUNTER — TELEPHONE (OUTPATIENT)
Dept: FAMILY MEDICINE | Facility: CLINIC | Age: 74
End: 2019-03-01

## 2019-03-01 NOTE — TELEPHONE ENCOUNTER
Reason for call:  Other   Patient called regarding (reason for call): Verbal orders   Additional comments: skilled nursing 2 x week 5 weeks; 1 x week for 1 week; home health aid 1 x 1 for 1 week, 2 x week for 5 weeks and 1 x week for 1 week.    Phone number to reach patient:  Other phone number:  Nette *[phone]    Best Time:  ASAP    Can we leave a detailed message on this number?  YES

## 2019-03-01 NOTE — TELEPHONE ENCOUNTER
Called and spoke with Nette from [facility]  and gave verbal OK for orders below.     Maribel Holder RN

## 2019-03-05 ENCOUNTER — TELEPHONE (OUTPATIENT)
Dept: FAMILY MEDICINE | Facility: CLINIC | Age: 74
End: 2019-03-05

## 2019-03-05 ENCOUNTER — MEDICAL CORRESPONDENCE (OUTPATIENT)
Dept: HEALTH INFORMATION MANAGEMENT | Facility: CLINIC | Age: 74
End: 2019-03-05

## 2019-03-05 NOTE — TELEPHONE ENCOUNTER
Called and left detailed message for Suzette MOTA Case Manager from [facility]  and gave verbal OK for orders below.   Advised to call RN Hotline [phone] back if any questions.    Maribel Holder RN

## 2019-03-05 NOTE — TELEPHONE ENCOUNTER
Reason for call:  Home Healthcare Reason for Call:  Home Health Care        Orders are needed for this patient. verbal    PT: na    OT: na    Skilled Nursing: yes- 2 times a week for 5 weeks and home health aid also.    Pt Provider: Michael    Phone Number Homecare Nurse can be reached at: [phone]    Can we leave a detailed message on this number? YES    Phone number patient can be reached at: Other phone number:  na*    Best Time: any    Call taken on 3/5/2019 at 10:30 AM by Stacy Kerns

## 2019-03-08 ENCOUNTER — MEDICAL CORRESPONDENCE (OUTPATIENT)
Dept: HEALTH INFORMATION MANAGEMENT | Facility: CLINIC | Age: 74
End: 2019-03-08

## 2019-03-08 NOTE — TELEPHONE ENCOUNTER
Patient reports she is unable to come in for appointments.     Patient reports she is homebound and unable to leave her home.     Patient is asking about the communication thing that they discussed in the clinic last time she was in. She couldn't recall exactly want happened or what was send.     Said I would send this message on to someone whom may be able to answer her questions better. Jia Mejia,

## 2019-03-11 ENCOUNTER — TELEPHONE (OUTPATIENT)
Dept: FAMILY MEDICINE | Facility: CLINIC | Age: 74
End: 2019-03-11

## 2019-03-11 NOTE — TELEPHONE ENCOUNTER
Called and spoke with patient. Stated she has some questions but has a visitor and can't speak right now.   Advised her if she has further questions she can call the RN Hotline back at a better time.     Maribel Holder RN

## 2019-03-11 NOTE — TELEPHONE ENCOUNTER
Reason for call:  Other   Patient called regarding (reason for call): call back  Additional comments: [facility] from [facility] is calling for verbal orders to discharge the patient from home care. Please call back    Phone number to reach patient:  Other phone number:  [phone]    Best Time:  any    Can we leave a detailed message on this number?  YES

## 2019-03-11 NOTE — TELEPHONE ENCOUNTER
Left message for patient to call RN hotline [phone].   Is there a reason to discharge her or patient no longer qualifies for services?   Pt requested on 2/27/19 for new home care orders but was non-compliant with other HC services. (See 2/27/19 encounter).    Maribel Holder RN

## 2019-03-12 NOTE — TELEPHONE ENCOUNTER
Suzette returned call to RN Hotline and left VM.     Called and spoke with Suzette.   Reports that patient is now using another Home Care service ([facility]) due to cheaper rates.   Gave verbal OK to discharge from home care.     Maribel Holder RN

## 2019-03-20 ENCOUNTER — TELEPHONE (OUTPATIENT)
Dept: FAMILY MEDICINE | Facility: CLINIC | Age: 74
End: 2019-03-20

## 2019-03-20 DIAGNOSIS — L97.929 ULCER OF LOWER LIMB, LEFT, WITH UNSPECIFIED SEVERITY (H): ICD-10-CM

## 2019-03-20 DIAGNOSIS — L97.919 ULCER OF RIGHT LEG (H): ICD-10-CM

## 2019-03-20 DIAGNOSIS — I77.6 VASCULITIS (H): Primary | ICD-10-CM

## 2019-03-20 DIAGNOSIS — L97.909 ULCER OF LOWER LIMB (H): ICD-10-CM

## 2019-03-20 NOTE — TELEPHONE ENCOUNTER
Reason for call:  Home Healthcare Reason for Call:  Home Health Care        Orders are needed for this patient. Wound care    PT: na    OT: sanjiv    Skilled Nursing: yes, Jami Bailey is calling. Patient has been getting wound care dressing changes for left lower leg twice weekly and they are not able to get the Hydrocelluar foam dressing non adhesive and would like to know what would be comparable? Please call      Pt Provider: Michael    Phone Number Homecare Nurse can be reached at: [phone]    Can we leave a detailed message on this number? YES on cell number    Phone number patient can be reached at: Other phone number:  na*    Best Time: 9 am to 5 pm    Call taken on 3/20/2019 at 1:09 PM by Stacy Kerns

## 2019-03-20 NOTE — TELEPHONE ENCOUNTER
Please advise on call below.   Patient has been receiving wound care by home care.   Unable to get Hydrocellular foam dressing.   Requesting an alternative to that type of dressing.     Maribel Holder RN

## 2019-03-25 ENCOUNTER — TELEPHONE (OUTPATIENT)
Dept: FAMILY MEDICINE | Facility: CLINIC | Age: 74
End: 2019-03-25

## 2019-03-25 NOTE — TELEPHONE ENCOUNTER
Jami Bailey calling to see if the wound supplies can be ordered or does she need to be seen? Please call and let know.

## 2019-03-25 NOTE — TELEPHONE ENCOUNTER
Left message for Jami Bailey from  to call RN hotline [phone].   Have wound care order form at RN desk - need more information.    Maribel Holder RN

## 2019-03-26 NOTE — TELEPHONE ENCOUNTER
Yuliya from [facility] returned call to RN Hotline.   Spoke with Jami Bailey who is requesting specific wound care orders for patient's bilateral leg wounds. Reports dressing change is 2x per week.  Advised her we received fax from [employer] regarding wound and can fax that to her to fill out and fax back. Gave red team RN fax.   Once fax is received with wound information, can have Dr. Rodriguez sign off on wound care orders and supplies.   Will need to determine equivalent to hydrocellular foam dressing.    TC- please fax wound form to Jami Bailey at [facility] Fax: [phone]    Maribel Holder RN

## 2019-04-01 NOTE — TELEPHONE ENCOUNTER
Called Jami Bailey back. She reported to just need orders for the medical supplies. Will have the RN to Call about what Jami Bailey is needing from the clinic. Jia Mejia,     [phone] phone for Yuliya.

## 2019-04-01 NOTE — TELEPHONE ENCOUNTER
Awaiting answers to questions for [facility] Orders for Home care. Team RN Talked with Home care Nurse on 3/26/2019. Order sheet faxed on 3/27/2019 to Home care RN to addressed the questions below.

## 2019-04-01 NOTE — TELEPHONE ENCOUNTER
Received a fax from Jami from [facility].    Called her per RN request to confirm if they received questionnaire. She said she didn't know but she reported that attached other doctors notes should be able to answer the questions on the order.     Asked if this can be done. Will give to the RN to review prior notes and order to see if this can be completed.     Phone number to contact Jaim was [phone].     Jia Mejia,

## 2019-04-01 NOTE — TELEPHONE ENCOUNTER
Jami Bailey left a message on RN hotline stating she would like to e-mail orders to ensure they are received call back [phone] if this is possible.  Saima Michael RN

## 2019-04-01 NOTE — TELEPHONE ENCOUNTER
Called Jami Bailey. Let her know we couldn't read the records. She will work on getting another copy faxed. Jia Mejia,

## 2019-04-01 NOTE — TELEPHONE ENCOUNTER
Spoke with Jami Bailey from [facility].   States she spoke with [facility] and they are able to use other provider's notes for wound.   Will just need DME order for supplies sent to [facility].   DME order placed. Awaiting signature.     Maribel Holder RN

## 2019-05-16 ENCOUNTER — TELEPHONE (OUTPATIENT)
Dept: FAMILY MEDICINE | Facility: CLINIC | Age: 74
End: 2019-05-16

## 2019-05-16 DIAGNOSIS — Z78.9 NEEDS ASSISTANCE WITH COMMUNITY RESOURCES: Primary | ICD-10-CM

## 2019-05-16 NOTE — TELEPHONE ENCOUNTER
Reason for call:  Other   Patient called regarding (reason for call): Patient information  Additional comments: Patient woud like to know when her last shignles shot was,  has shingles.    Phone number to reach patient:  Home number on file [phone] (home)    Best Time:  ASAP    Can we leave a detailed message on this number?  NO

## 2019-05-16 NOTE — TELEPHONE ENCOUNTER
Patient declined care coordination in January. Can place new referral if she would like. The Shingrix vaccine will not protect her from getting shingles from her  now (takes a couple weeks for immunity and the series is 2 doses). Recommend he keep rash covered, she avoid touching the rash, good handwashing.    Kiara Martines, CNP

## 2019-05-16 NOTE — TELEPHONE ENCOUNTER
Patient notified of Provider's message as written.  Patient verbalized understanding.    She requested CC referral  Referral placed to assist with transportation and medical insurance needs    Driss Schaffer RN

## 2019-05-16 NOTE — TELEPHONE ENCOUNTER
Spoke to patient and informed her there is a new shingles vaccine called Shingrix out. She stated she does not have insurance anymore. When she was in for last appointment care coordination was mentioned but she was never contacted. Please advise if new referral is needed or what the next step, if any, would be.  Kriss COVARRUBIAS CMA ([facility])

## 2019-05-17 ENCOUNTER — PATIENT OUTREACH (OUTPATIENT)
Dept: CARE COORDINATION | Facility: CLINIC | Age: 74
End: 2019-05-17

## 2019-05-17 NOTE — PROGRESS NOTES
Clinic Care Coordination Contact  [facility] Social Work    Clinical Data: Care Coordinator Outreach  Outreach attempted x 1.  Left message on voicemail with call back information and requested return call.    Plan: Care Coordinator will mail out care coordination introduction letter with care coordinator contact information and explanation of care coordination services. Care Coordinator will try to reach patient again in 1-2 business days.    Fransisca Radford [facility]  , Clinic Care Coordination  Clinics:  [facility] [place], [place]  [phone]   5/17/2019   10:39 AM

## 2019-05-17 NOTE — LETTER
[place] CARE COORDINATION  [facility]   [address] [place] [address]  YARIEL Mason [address]  [phone]    May 17, 2019    Marlys A Tietze  [address] [facility] [address]      Dear Oanh,    I am a clinic care coordinator who works with Ella Rodriguez MD at the [facility].  I was informed you might have questions about transportation and insurance.      I wanted to introduce myself and provide you with my contact information for you to be able to call me with any questions or concerns. I wanted to introduce myself and provide you with my contact information so that you can call me with questions or concerns about your health care. Below is a description of clinic care coordination and how I can further assist you.     The clinic care coordinator is a registered nurse and/or  who understand the health care system. The goal of clinic care coordination is to help you manage your health and improve access to the [facility] system in the most efficient manner. The registered nurse can assist you in meeting your health care goals by providing education, coordinating services, and strengthening the communication among your providers. The  can assist you with financial, behavioral, psychosocial, chemical dependency, counseling, and/or psychiatric resources.    Please feel free to contact me at [phone], with any questions or concerns. We at [facility] are focused on providing you with the highest-quality healthcare experience possible and that all starts with you.     Sincerely,     Fransisca Radford [facility]  , Clinic Care Coordination  Clinics:  [facility], [place] (covering the [facility] )  [phone]   5/17/2019   10:45 AM    Enclosed: I have enclosed a copy of a 24 Hour Access Plan. This has helpful phone numbers for you to call when needed. Please keep this in an easy to access place to use as  needed.

## 2019-05-17 NOTE — LETTER
Health Care Home - Access Care Plan    About Me:    Patient Name:  Marlys A Tietz    YOB: 1945  Age:                      74 year old   Viki MRN:     [id number] Telephone Information:   Home Phone [phone]   Mobile [phone]   Home Phone [phone]       Address:  [address] [place] [address] Email address:  [email or web address]      Emergency Contact(s)   Name Relationship Lgl Grd Work Phone Home Phone Mobile Phone   1. TIETZ, GERALD Spouse   [phone]   2. TIETZ, NICK Son   [phone]              Health Maintenance:      My Access Plan  Medical Emergency 911   Questions or concerns during clinic hours Primary Clinic Line, I will call the clinic directly: [facility] - [phone]   24 Hour Appointment Line [phone] or  [phone] [place] [phone]) (toll free)   24 Hour Nurse Line [phone] (toll free)   Questions or concerns outside clinic hours 24 Hour Appointment Line, I will call the after-hours on-call line:   [facility] [phone] or [phone] ([phone]) (toll-free)   Preferred Urgent Care     Preferred Hospital     Preferred Pharmacy [facility] [address] [place] [address] [address] [facility]     Behavioral Health Crisis Line The National Suicide Prevention Lifeline at 1-367.988.2131 or 357             My Care Team Members  Patient Care Team       Relationship Specialty Notifications Start End    Ella Rodriguez MD PCP - General   3/10/09     Phone: [phone] Fax: [phone] [address] [facility] [address]    Ella Rodriguez MD Assigned PCP   10/4/12     Phone: [phone] Fax: [phone] [address] [facility] [address]    Shama Alamo MD MD Rheumatology  7/24/17     Phone: [phone] Fax: [phone]         [facility] [address] [facility] [address]    Disha Radford, LSW Clinic Care Coordinator Primary Care - CC  Admissions 5/17/19     (Fransisca MCCRACKEN)    Phone: [phone] Fax: [phone]               My Medical and Care Information  Problem List   Patient Active Problem List   Diagnosis     HYPERLIPIDEMIA LDL GOAL <130     Vasculitis (H)     Fibromyalgia     Osteoporosis     Osteoarthritis     Systemic lupus erythematosus (H)     Advanced directives, counseling/discussion     Lymphocytic colitis     Hypertension goal BP (blood pressure) < 140/90     Impaired glucose tolerance     Diverticulitis     Diverticulitis of colon     Tear of right rotator cuff     Tear of left rotator cuff     Hip abductor tear     H/O bacterial endocarditis     H/O endocarditis     High risk medication use      Current Medications and Allergies:  See printed Medication Report

## 2019-05-21 ENCOUNTER — PATIENT OUTREACH (OUTPATIENT)
Dept: CARE COORDINATION | Facility: CLINIC | Age: 74
End: 2019-05-21

## 2019-05-21 NOTE — PROGRESS NOTES
Clinic Care Coordination Contact  [facility] -  Social Work     Clinical Data: Care Coordinator Outreach  Outreach attempted x 2.  Left message on voicemail with call back information and requested return call.    Plan: Care Coordinator mailed out care coordination introduction letter on 5/17/19 with no response yet . Care Coordinator will try to reach patient again in 5-7 business days.    Fransisca Radford [facility]  , Clinic Care Coordination  Clinics:  [facility], [place]  [phone]   5/21/2019   9:02 AM

## 2019-06-14 ENCOUNTER — OFFICE VISIT (OUTPATIENT)
Dept: FAMILY MEDICINE | Facility: CLINIC | Age: 74
End: 2019-06-14
Payer: MEDICARE

## 2019-06-14 VITALS — OXYGEN SATURATION: 96 % | HEART RATE: 89 BPM | TEMPERATURE: 96.8 F | RESPIRATION RATE: 18 BRPM

## 2019-06-14 DIAGNOSIS — L08.9 WOUND INFECTION: ICD-10-CM

## 2019-06-14 DIAGNOSIS — Z79.899 HIGH RISK MEDICATION USE: ICD-10-CM

## 2019-06-14 DIAGNOSIS — I10 HYPERTENSION GOAL BP (BLOOD PRESSURE) < 140/90: ICD-10-CM

## 2019-06-14 DIAGNOSIS — L97.922 ULCER OF LEFT LOWER EXTREMITY WITH FAT LAYER EXPOSED (H): Primary | ICD-10-CM

## 2019-06-14 DIAGNOSIS — T14.8XXA WOUND INFECTION: ICD-10-CM

## 2019-06-14 LAB
ALBUMIN SERPL-MCNC: 3.6 G/DL (ref 3.4–5)
ALP SERPL-CCNC: 104 U/L (ref 40–150)
ALT SERPL W P-5'-P-CCNC: 16 U/L (ref 0–50)
ANION GAP SERPL CALCULATED.3IONS-SCNC: 10 MMOL/L (ref 3–14)
AST SERPL W P-5'-P-CCNC: 17 U/L (ref 0–45)
BASOPHILS # BLD AUTO: 0.2 10E9/L (ref 0–0.2)
BASOPHILS NFR BLD AUTO: 1 %
BILIRUB DIRECT SERPL-MCNC: 0.2 MG/DL (ref 0–0.2)
BILIRUB SERPL-MCNC: 0.8 MG/DL (ref 0.2–1.3)
BUN SERPL-MCNC: 41 MG/DL (ref 7–30)
CALCIUM SERPL-MCNC: 9.8 MG/DL (ref 8.5–10.1)
CHLORIDE SERPL-SCNC: 108 MMOL/L (ref 94–109)
CO2 SERPL-SCNC: 25 MMOL/L (ref 20–32)
CREAT SERPL-MCNC: 0.54 MG/DL (ref 0.52–1.04)
DIFFERENTIAL METHOD BLD: ABNORMAL
EOSINOPHIL # BLD AUTO: 0.2 10E9/L (ref 0–0.7)
EOSINOPHIL NFR BLD AUTO: 1 %
ERYTHROCYTE [DISTWIDTH] IN BLOOD BY AUTOMATED COUNT: 16.5 % (ref 10–15)
GFR SERPL CREATININE-BSD FRML MDRD: >90 ML/MIN/{1.73_M2}
GLUCOSE SERPL-MCNC: 107 MG/DL (ref 70–99)
HCT VFR BLD AUTO: 32.1 % (ref 35–47)
HGB BLD-MCNC: 9 G/DL (ref 11.7–15.7)
LYMPHOCYTES # BLD AUTO: 0.7 10E9/L (ref 0.8–5.3)
LYMPHOCYTES NFR BLD AUTO: 4 %
MCH RBC QN AUTO: 25.7 PG (ref 26.5–33)
MCHC RBC AUTO-ENTMCNC: 28 G/DL (ref 31.5–36.5)
MCV RBC AUTO: 92 FL (ref 78–100)
MONOCYTES # BLD AUTO: 0.7 10E9/L (ref 0–1.3)
MONOCYTES NFR BLD AUTO: 4 %
NEUTROPHILS # BLD AUTO: 15.9 10E9/L (ref 1.6–8.3)
NEUTROPHILS NFR BLD AUTO: 90 %
PLATELET # BLD AUTO: 396 10E9/L (ref 150–450)
PLATELET # BLD EST: ABNORMAL 10*3/UL
POTASSIUM SERPL-SCNC: 3.8 MMOL/L (ref 3.4–5.3)
PROT SERPL-MCNC: 6.8 G/DL (ref 6.8–8.8)
RBC # BLD AUTO: 3.5 10E12/L (ref 3.8–5.2)
SODIUM SERPL-SCNC: 143 MMOL/L (ref 133–144)
SPHEROCYTES BLD QL SMEAR: SLIGHT
STOMATOCYTES BLD QL SMEAR: SLIGHT
WBC # BLD AUTO: 17.7 10E9/L (ref 4–11)

## 2019-06-14 PROCEDURE — 80076 HEPATIC FUNCTION PANEL: CPT | Performed by: NURSE PRACTITIONER

## 2019-06-14 PROCEDURE — 87181 SC STD AGAR DILUTION PER AGT: CPT | Performed by: NURSE PRACTITIONER

## 2019-06-14 PROCEDURE — 36415 COLL VENOUS BLD VENIPUNCTURE: CPT | Performed by: NURSE PRACTITIONER

## 2019-06-14 PROCEDURE — 87070 CULTURE OTHR SPECIMN AEROBIC: CPT | Performed by: NURSE PRACTITIONER

## 2019-06-14 PROCEDURE — 80048 BASIC METABOLIC PNL TOTAL CA: CPT | Performed by: NURSE PRACTITIONER

## 2019-06-14 PROCEDURE — 99213 OFFICE O/P EST LOW 20 MIN: CPT | Performed by: NURSE PRACTITIONER

## 2019-06-14 PROCEDURE — 87077 CULTURE AEROBIC IDENTIFY: CPT | Performed by: NURSE PRACTITIONER

## 2019-06-14 PROCEDURE — 85025 COMPLETE CBC W/AUTO DIFF WBC: CPT | Performed by: NURSE PRACTITIONER

## 2019-06-14 RX ORDER — DOXYCYCLINE 100 MG/1
100 CAPSULE ORAL 2 TIMES DAILY
Qty: 20 CAPSULE | Refills: 0 | Status: SHIPPED | OUTPATIENT
Start: 2019-06-14 | End: 2019-06-24

## 2019-06-14 NOTE — PROGRESS NOTES
Subjective     Marlys A Tietz is a 74 year old female who presents to clinic today for the following health issues:    HPI   Chief Complaint   Patient presents with     Infection     possible Infection in lt leg, x2 days      Patient has a history of chronic left lower leg ulcer.  She is treated by a wound care specialist who come to her home.  Her dressing is changed once weekly and it was noted that there was green discharge and some mild erythema from the superior portion of her left medial leg ulcer.  She was recommended to follow-up for evaluation of potential infection.   She denies fever.  She is chronically immunosuppressed due to lupus.  She denies pain.        Patient Active Problem List   Diagnosis     HYPERLIPIDEMIA LDL GOAL <130     Vasculitis (H)     Fibromyalgia     Osteoporosis     Osteoarthritis     Systemic lupus erythematosus (H)     Advanced directives, counseling/discussion     Lymphocytic colitis     Hypertension goal BP (blood pressure) < 140/90     Impaired glucose tolerance     Diverticulitis     Diverticulitis of colon     Tear of right rotator cuff     Tear of left rotator cuff     Hip abductor tear     H/O bacterial endocarditis     H/O endocarditis     High risk medication use     Past Surgical History:   Procedure Laterality Date     NO HISTORY OF SURGERY         Social History     Tobacco Use     Smoking status: Never Smoker     Smokeless tobacco: Never Used   Substance Use Topics     Alcohol use: Yes     Alcohol/week: 0.5 oz     Types: 1 Standard drinks or equivalent per week     Family History   Problem Relation Age of Onset     Hypertension Mother      Osteoporosis Mother          Current Outpatient Medications   Medication Sig Dispense Refill     aspirin 81 MG tablet Take  by mouth daily.       atorvastatin (LIPITOR) 40 MG tablet Take 1 tablet (40 mg) by mouth daily 90 tablet 3     azathioprine (IMURAN) 50 MG tablet Take 50 mg by mouth daily.       budesonide (ENTOCORT EC) 3 MG 24  hr capsule Take 3 mg by mouth 2 times daily.       budesonide (ENTOCORT EC) 3 MG EC capsule Take 1 capsule (3 mg) by mouth 2 times daily       Calcium Citrate-Vitamin D 1000-400 LIQD Take 2 tsp. by mouth daily       Cyanocobalamin (VITAMIN B 12 PO) Take 1,000 mcg by mouth daily       doxycycline monohydrate (MONODOX) 100 MG capsule Take 1 capsule (100 mg) by mouth 2 times daily for 10 days 20 capsule 0     lisinopril (PRINIVIL/ZESTRIL) 5 MG tablet Take 1 tablet (5 mg) by mouth daily 90 tablet 3     MULTIVITAMIN TABS   OR 1 TABLET DAILY       OMEPRAZOLE PO Take 10 mg by mouth daily       order for DME Equipment being ordered: Wound Care Supplies.   - 4x4 non-adhesive foam dressing   - Dermacea guaze roll () - 4' x 4.1 yards  - 4x4 Telfa island dressing/guaze-non-adhesive, non-sterile    **Do not ship order until nurse calls to place order** 1 Package 12     predniSONE (DELTASONE) 5 MG tablet Take 1 tablet (5 mg) by mouth daily       teriparatide, recombinant, (FORTEO) 600 MCG/2.4ML SOLN injection Inject 0.08 mLs (20 mcg) Subcutaneous daily       TYLENOL EXTRA STRENGTH 500 MG OR TABS 1 TABLET EVERY 4 HOURS AS NEEDED       cephALEXin (KEFLEX) 500 MG capsule 2 grams  Half hour before Procedure (Patient not taking: Reported on 6/14/2019) 4 capsule 3     Allergies   Allergen Reactions     Penicillins      Reaction unknown     Shellfish-Derived Products      Other reaction(s): Edema  Eyes swelled shut     Sulfa Drugs      Was told by mother         Reviewed and updated as needed this visit by Provider  Tobacco  Allergies  Meds  Problems  Med Hx  Surg Hx  Fam Hx         Review of Systems   ROS COMP: Constitutional, HEENT, cardiovascular, pulmonary, gi and gu systems are negative, except as otherwise noted.      Objective    Pulse 89   Temp 96.8  F (36  C) (Oral)   Resp 18   SpO2 96%   There is no height or weight on file to calculate BMI.  Physical Exam   GENERAL: healthy, alert and no distress  RESP: lungs  clear to auscultation - no rales, rhonchi or wheezes  CV: regular rate and rhythm, normal S1 S2, no S3 or S4, no murmur, click or rub, no peripheral edema and peripheral pulses strong  MS: no gross musculoskeletal defects noted, no edema  SKIN: Left lower extremity ulcer: yellow slough noted to base of lateral ulcer, no surrounding erythema noted; some yellow slough noted to base of medial ulcer and some purulent drainage erythema noted to superior portion of ulcer.    Diagnostic Test Results:  Labs reviewed in Epic        Assessment & Plan     1. Ulcer of left lower extremity with fat layer exposed (H)  Wound culture taken and wound cleaned and redressed with telfa and kerlix.    2. Hypertension goal BP (blood pressure) < 140/90  Stable.  Continue current treatment plan and medications.   - Basic metabolic panel    3. Wound infection  Will start treatment while awaiting culture results.  - doxycycline monohydrate (MONODOX) 100 MG capsule; Take 1 capsule (100 mg) by mouth 2 times daily for 10 days  Dispense: 20 capsule; Refill: 0  - Wound Culture Aerobic Bacterial; Future  - Wound Culture Aerobic Bacterial    4. High risk medication use  Patient requests labs for her rheumatologist, as it's difficult for her to leave her home.  - CBC with platelets differential  - Hepatic panel           Return in about 6 months (around 12/14/2019).    YEVGENIY Hill [facility]

## 2019-06-17 NOTE — RESULT ENCOUNTER NOTE
Dear Oanh,    Your recent test results are attached.      Normal liver enzymes.  Normal kidney function and electrolytes.  Improved anemia from last labs.  Elevated WBC count consistent with prednisone use and last lab results.      If you have any questions please feel free to contact [phone] or myself via [employer].    Sincerely,  Kaylah Fisher, CNP

## 2019-06-19 ENCOUNTER — PATIENT OUTREACH (OUTPATIENT)
Dept: CARE COORDINATION | Facility: CLINIC | Age: 74
End: 2019-06-19

## 2019-06-19 NOTE — PROGRESS NOTES
Clinic Care Coordination Contact  [facility]/Voicemail- Social Work    Clinical Data: Care Coordinator Outreach  Outreach attempted x 3.  Left message on voicemail with call back information and requested return call.    Plan: Care Coordinator mailed out care coordination introduction letter on 5/17/19.  No response to a letter and 3 calls. .  Care Coordinator will do no further outreaches at this time.    Fransisca [facility]  , Clinic Care Coordination  Clinics:  [facility], [place]  [phone]   6/19/2019   3:44 PM

## 2019-06-19 NOTE — RESULT ENCOUNTER NOTE
Does she have follow up in the clinic or with home care?  If not, she needs seen at the end of her antibiotic treatment or sooner if worsening.

## 2019-06-21 LAB
BACTERIA SPEC CULT: ABNORMAL
Lab: ABNORMAL
SPECIMEN SOURCE: ABNORMAL

## 2019-07-19 DIAGNOSIS — L08.9 WOUND INFECTION: ICD-10-CM

## 2019-07-19 DIAGNOSIS — T14.8XXA WOUND INFECTION: ICD-10-CM

## 2019-07-19 NOTE — TELEPHONE ENCOUNTER
doxycycline monohydrate (MONODOX) 100 MG capsule      Last Written Prescription Date:  6-  Last Fill Quantity: 20,   # refills: 0  Last Office Visit: 6-  Future Office visit:       Routing refill request to provider for review/approval because:  Drug not on the [facility], [facility] or [facility] refill protocol or controlled substance

## 2019-07-22 ENCOUNTER — TELEPHONE (OUTPATIENT)
Dept: FAMILY MEDICINE | Facility: CLINIC | Age: 74
End: 2019-07-22

## 2019-07-22 NOTE — TELEPHONE ENCOUNTER
Was seen by Kaylah Fisher NP on 6/14/19 and prescribed doxycycline for leg wound infection  She stated that she stopped taking doxycycline after 6 days due to diarrhea   She thinks the wound is getting better but wants to know what the next steps are  She stated that home care has been treating her wounds and that a Dr. Kahlil Ortega comes out to her home to manage this same leg wound which is the only wound she has currently  Per patient, the HC nurses did wound cares today and Dr. Ortega will be coming out in a couple of days to reassess  She is in a lot of pain after wound care and is using tylenol   She declined to use any other pain meds and stated she is ok on tylenol    She provided phone numbers for Jami Bailey at [phone] or Letty Matos [phone] with [facility]  Dr. Kahlil Ortega (podiatry) at [phone] [facility]     Called Jami Bailey and she stated that they are working with Dr. Ortega to treat patient's leg wound  She stated that they send reports on the wounds and any concerns to Dr. Ortega, not [facility]  They were not aware that patient saw Kaylah Fisher NP for this same issue and were not aware that patient was on doxycycline  She will reach out to patient to discuss further    Driss Schaffer RN

## 2019-07-22 NOTE — TELEPHONE ENCOUNTER
Pt called wanting rx for doxycycline monohydrate.  Not at .  Please call pt to advise.  Caller informed that calls received after 3pm may not be returned same day.  Thank you

## 2019-07-24 RX ORDER — DOXYCYCLINE 100 MG/1
CAPSULE ORAL
Qty: 20 CAPSULE | Refills: 0 | OUTPATIENT
Start: 2019-07-24

## 2019-07-24 NOTE — TELEPHONE ENCOUNTER
Doxycycline was prescribed for a wound infection  See 7/22/19 phone encounter  Patient has a podiatrist and HC nurses that are managing her wound    Driss Schaffer RN

## 2019-09-13 DIAGNOSIS — I10 ESSENTIAL HYPERTENSION WITH GOAL BLOOD PRESSURE LESS THAN 140/90: ICD-10-CM

## 2019-09-16 RX ORDER — LISINOPRIL 5 MG/1
TABLET ORAL
Qty: 90 TABLET | Refills: 1 | Status: SHIPPED | OUTPATIENT
Start: 2019-09-16 | End: 2020-03-02

## 2019-10-03 ENCOUNTER — TELEPHONE (OUTPATIENT)
Dept: FAMILY MEDICINE | Facility: CLINIC | Age: 74
End: 2019-10-03

## 2019-10-03 ENCOUNTER — TRANSFERRED RECORDS (OUTPATIENT)
Dept: HEALTH INFORMATION MANAGEMENT | Facility: CLINIC | Age: 74
End: 2019-10-03

## 2019-10-03 DIAGNOSIS — M19.90 OSTEOARTHRITIS, UNSPECIFIED OSTEOARTHRITIS TYPE, UNSPECIFIED SITE: ICD-10-CM

## 2019-10-03 DIAGNOSIS — I10 HYPERTENSION GOAL BP (BLOOD PRESSURE) < 140/90: ICD-10-CM

## 2019-10-03 DIAGNOSIS — I77.6 VASCULITIS (H): Primary | ICD-10-CM

## 2019-10-03 DIAGNOSIS — Z78.9 MEDICALLY COMPLEX PATIENT: ICD-10-CM

## 2019-10-03 DIAGNOSIS — M81.0 OSTEOPOROSIS, UNSPECIFIED OSTEOPOROSIS TYPE, UNSPECIFIED PATHOLOGICAL FRACTURE PRESENCE: ICD-10-CM

## 2019-10-03 DIAGNOSIS — M79.7 FIBROMYALGIA: ICD-10-CM

## 2019-10-03 DIAGNOSIS — E78.5 HYPERLIPIDEMIA LDL GOAL <130: ICD-10-CM

## 2019-10-03 LAB
ALT SERPL-CCNC: 10 IU/L (ref 8–45)
AST SERPL-CCNC: 16 IU/L (ref 2–40)

## 2019-10-03 NOTE — TELEPHONE ENCOUNTER
Spoke with pt and she gave verbal consent to speak with spouse. Pt has a health agent come to her home currently who is supervised by an RN. Her skilled services have ended and is now paying out of pocket for her cares. They do not offer flu shots with their agency. Advised to contact [facility] to see if they can provide a nurse who can do a flu shot, but this would likely be out of pocket and they would need to call to discuss expense. Phone number for [facility] provided.    Pt had labs drawn this am and hgb has dropped from 9.0 3 months ago to 8.1 today. Pt is not experiencing any SOB, dizziness, or chest pain. Can provider please review care everywhere results from [facility] and advise? Thanks.    Kriss Segovia RN  [facility]

## 2019-10-03 NOTE — TELEPHONE ENCOUNTER
Reason for call:  Other   Patient called regarding (reason for call): call back  Additional comments: Patients  is calling because he wants to know how his wife can get a flu shot at home as she is homebound. He also want to discuss her hemoglobin. Please call back     Phone number to reach patient:  Home number on file [phone] (home)    Best Time:  any    Can we leave a detailed message on this number?  YES

## 2019-10-04 NOTE — TELEPHONE ENCOUNTER
I am not sure how to get In home Doctor  Her blood count is low  Not sure what is going on  Nursing Home as In house Physicians  Please check if complex care clinic has In home service and we can do a referral

## 2019-10-04 NOTE — TELEPHONE ENCOUNTER
Called and spoke with patient and gave information. Patient states she his homebound. Advised patient Dr. Rodriguez cannot treat her without seeing her as it's been almost a year since she has been seen. Patient started arguing that she is homebound and cannot leave the house and she's jumped through so many hoops to have someone come out to her house to have her blood drawn. Again advised patient, Dr. Rodriguez needs to see her in order to treat.     Patient wondering if she can get orders for an in-home doctor to come see her. Is this possible?     Maribel Cordero RN

## 2019-10-07 NOTE — TELEPHONE ENCOUNTER
Huddled with Dr. Rodriguez.   Placed referral for complex care.    Referral placed.   Patient notified.    Maribel Cordero RN

## 2019-10-17 ENCOUNTER — TELEPHONE (OUTPATIENT)
Dept: FAMILY MEDICINE | Facility: CLINIC | Age: 74
End: 2019-10-17

## 2019-10-17 NOTE — TELEPHONE ENCOUNTER
Reason for Call:  Other call back    Detailed comments: patient called and needs Home Health Care.    Per patient, no one has called and informed her.        Phone Number Patient can be reached at: Home number on file [phone] (home)    Best Time: asap    Can we leave a detailed message on this number? YES    Call taken on 10/17/2019 at 1:26 PM by Brigitte Reza

## 2019-10-17 NOTE — TELEPHONE ENCOUNTER
Huddled with Dr. Rodriguez. Can place a referral for homecare skilled nursing, PT and OT if needed since pt is home bound. Spoke with pt and spouse. Pt is homebound and is trying to get a flu shot. She was referred to [facility] on 10/7, but did not receive a call from their clinic. They were provided the number to call the clinic. After speaking with them discussed with RN care coordinator and she advised there is [facility] program [phone]. Called and left detailed message with this number. Also asked in message if they had tried to call [facility] to come out for the shot and asked if they need a referral to [facility]. Asked that pt call back to the RN hotline if any further questions.    Kriss Segovia RN  [facility]

## 2019-11-19 ENCOUNTER — OFFICE VISIT (OUTPATIENT)
Dept: FAMILY MEDICINE | Facility: CLINIC | Age: 74
End: 2019-11-19
Payer: MEDICARE

## 2019-11-19 VITALS
SYSTOLIC BLOOD PRESSURE: 112 MMHG | TEMPERATURE: 97.9 F | OXYGEN SATURATION: 98 % | WEIGHT: 188.5 LBS | DIASTOLIC BLOOD PRESSURE: 64 MMHG | BODY MASS INDEX: 32.87 KG/M2 | HEART RATE: 93 BPM

## 2019-11-19 DIAGNOSIS — L97.929 INFECTED STASIS ULCER OF LEFT LOWER EXTREMITY (H): Primary | ICD-10-CM

## 2019-11-19 DIAGNOSIS — M32.9 SYSTEMIC LUPUS ERYTHEMATOSUS, UNSPECIFIED SLE TYPE, UNSPECIFIED ORGAN INVOLVEMENT STATUS (H): ICD-10-CM

## 2019-11-19 DIAGNOSIS — I83.229 INFECTED STASIS ULCER OF LEFT LOWER EXTREMITY (H): Primary | ICD-10-CM

## 2019-11-19 PROCEDURE — 29580 STRAPPING UNNA BOOT: CPT | Mod: LT | Performed by: FAMILY MEDICINE

## 2019-11-19 PROCEDURE — 99213 OFFICE O/P EST LOW 20 MIN: CPT | Mod: 25 | Performed by: FAMILY MEDICINE

## 2019-11-19 RX ORDER — BACITRACIN ZINC AND POLYMYXIN B SULFATE 500; 10000 [USP'U]/G; [USP'U]/G
1 OINTMENT TOPICAL 2 TIMES DAILY
Qty: 60 CAPSULE | Refills: 0 | Status: SHIPPED | OUTPATIENT
Start: 2019-11-19 | End: 2020-03-02 | Stop reason: CLARIF

## 2019-11-19 RX ORDER — CLINDAMYCIN HCL 300 MG
300 CAPSULE ORAL 4 TIMES DAILY
Qty: 28 CAPSULE | Refills: 0 | Status: SHIPPED | OUTPATIENT
Start: 2019-11-19 | End: 2020-03-02

## 2019-11-19 NOTE — PROGRESS NOTES
Subjective     Marlys A Tietz is a 74 year old female who presents to clinic today for the following health issues:    HPI   Chief Complaint   Patient presents with     Derm Problem     Left ankle ; has a homecare nurse that comes and took a picture and sent to the foot/ankle doctor and was informed to come in; puss and redness and open ; happened previously back in june in a different area     Patient presents for management of chronic stasis ulcer of LLE.  This is a result of lupus history.  Patient sees Dr. Alamo - [facility] -   [facility]  Home care aids - wound treatment 2x per week with wound care doc  Over the past few days would has looked more angry and red with more purulent discharge than she's used to having.  Patient currently has no systemic signs.    BP Readings from Last 3 Encounters:   11/19/19 112/64   11/08/18 120/70   09/13/17 124/72    Wt Readings from Last 3 Encounters:   11/19/19 85.5 kg (188 lb 8 oz)   03/03/17 63 kg (139 lb)   02/19/17 64.1 kg (141 lb 4.8 oz)                      Reviewed and updated as needed this visit by Provider  Tobacco  Allergies  Meds  Problems  Med Hx  Surg Hx  Fam Hx         Review of Systems   ROS COMP: Constitutional, HEENT, cardiovascular, pulmonary, gi and gu systems are negative, except as otherwise noted.      Objective    /64   Pulse 93   Temp 97.9  F (36.6  C) (Oral)   Wt 85.5 kg (188 lb 8 oz)   SpO2 98%   BMI 32.87 kg/m    Body mass index is 32.87 kg/m .  Physical Exam   GENERAL: healthy, alert and no distress  EYES: Eyes grossly normal to inspection, PERRL and conjunctivae and sclerae normal  NECK: no adenopathy, no asymmetry, masses, or scars and thyroid normal to palpation  MS: no gross musculoskeletal defects noted, no edema  SKIN: large LLE ulcer with surrounding induration, erythema,  tenderness  PSYCH: mentation appears normal, affect normal/bright          Assessment & Plan       ICD-10-CM    1. Infected  stasis ulcer of left lower extremity (H) I83.229 clindamycin (CLEOCIN) 300 MG capsule    L97.929 Bacillus Coagulans-Inulin (PROBIOTIC FORMULA) 1-250 BILLION-MG CAPS     UNNA BOOT APPLICATION   2. Systemic lupus erythematosus, unspecified SLE type, unspecified organ involvement status (H) M32.9    Unna boot applied, recommend removal by home care nurse in 2-3 days when they come to patient's home  Will give course of antibiotic,  Recommend taking probiotics while on antibiotics     Follow-up in 3 days with home care  Kahlil Barbosa MD  [facility]

## 2019-11-20 ENCOUNTER — TELEPHONE (OUTPATIENT)
Dept: FAMILY MEDICINE | Facility: CLINIC | Age: 74
End: 2019-11-20

## 2019-11-20 NOTE — TELEPHONE ENCOUNTER
Reason for Call:  Other call back    Detailed comments: Pt was prescribed anti biotic, the pharmacist told them they don't have the anti-biotic in there location. So Pt wants to know what anti-biotic this is and what its for.    Phone Number Patient can be reached at: Home number on file [phone] (home)      Best Time: any    Can we leave a detailed message on this number? YES    Call taken on 11/20/2019 at 12:45 PM by Shay Carr

## 2019-11-20 NOTE — TELEPHONE ENCOUNTER
Reason for Call:  Other prescription    Detailed comments: Pt doesn't know where to find Bacillus Coagulans-Inulin (PROBIOTIC FORMULA) 1-250 BILLION-MG CAPS, Pt's  called 3 different pharmacy's and none of them have that high of dosage    Phone Number Patient can be reached at: Home number on file [phone] (home)    Best Time: any    Can we leave a detailed message on this number? YES    Call taken on 11/20/2019 at 3:27 PM by Shay Carr

## 2019-11-20 NOTE — TELEPHONE ENCOUNTER
Dr. Flood-  Please see phone message below. Please advise. Would you like pt to take lower dose if available at pharmacy?

## 2019-11-21 ENCOUNTER — MYC MEDICAL ADVICE (OUTPATIENT)
Dept: FAMILY MEDICINE | Facility: CLINIC | Age: 74
End: 2019-11-21

## 2019-11-21 NOTE — TELEPHONE ENCOUNTER
Any probiotic that's readily available to be taken 2x per day will be fine.  I recommend culturelle over the counter.    Kahlil Barbosa MD

## 2019-11-21 NOTE — TELEPHONE ENCOUNTER
Called and spoke with patient's  & notified of provider's information as written. Verbalizes understanding & no further questions.     Maribel Cordero RN

## 2019-12-05 DIAGNOSIS — I10 ESSENTIAL HYPERTENSION WITH GOAL BLOOD PRESSURE LESS THAN 140/90: ICD-10-CM

## 2019-12-06 RX ORDER — LISINOPRIL 5 MG/1
TABLET ORAL
Qty: 90 TABLET | Refills: 0 | Status: SHIPPED | OUTPATIENT
Start: 2019-12-06 | End: 2020-03-02

## 2019-12-10 DIAGNOSIS — E78.5 HYPERLIPIDEMIA LDL GOAL <130: ICD-10-CM

## 2019-12-10 RX ORDER — ATORVASTATIN CALCIUM 40 MG/1
TABLET, FILM COATED ORAL
Qty: 90 TABLET | Refills: 0
Start: 2019-12-10

## 2019-12-10 NOTE — TELEPHONE ENCOUNTER
duplicate     Disp Refills Start End CARTER   atorvastatin (LIPITOR) 40 MG tablet 30 tablet 0 12/10/2019  No   Sig: TAKE 1 TABLET(40 MG) BY MOUTH DAILY   Sent to pharmacy as: atorvastatin (LIPITOR) 40 MG tablet   Class: E-Prescribe   Notes to Pharmacy: Due for labs   Order: [id number]   E-Prescribing Status: Receipt confirmed by pharmacy (12/10/2019  1:33 PM CST)

## 2020-01-29 ENCOUNTER — TRANSFERRED RECORDS (OUTPATIENT)
Dept: HEALTH INFORMATION MANAGEMENT | Facility: CLINIC | Age: 75
End: 2020-01-29

## 2020-02-08 ENCOUNTER — HEALTH MAINTENANCE LETTER (OUTPATIENT)
Age: 75
End: 2020-02-08

## 2020-02-29 ENCOUNTER — TELEPHONE (OUTPATIENT)
Dept: GERIATRICS | Facility: CLINIC | Age: 75
End: 2020-02-29

## 2020-02-29 NOTE — TELEPHONE ENCOUNTER
Patient has a wound on extremity, uncomfortable with dressing changes. Gave ok for Tylenol 1000 mg PO TID for pain and stop any PRN tylenol orders. Staff to update provider if not effective.     Electronically signed by YEVGENIY Milton, GNP

## 2020-03-01 ENCOUNTER — RECORDS - HEALTHEAST (OUTPATIENT)
Dept: LAB | Facility: CLINIC | Age: 75
End: 2020-03-01

## 2020-03-01 LAB — HGB BLD-MCNC: 7.5 G/DL (ref 12–16)

## 2020-03-02 ENCOUNTER — NURSING HOME VISIT (OUTPATIENT)
Dept: GERIATRICS | Facility: CLINIC | Age: 75
End: 2020-03-02
Payer: MEDICARE

## 2020-03-02 VITALS
WEIGHT: 188.8 LBS | HEART RATE: 95 BPM | SYSTOLIC BLOOD PRESSURE: 115 MMHG | TEMPERATURE: 98.6 F | BODY MASS INDEX: 32.23 KG/M2 | RESPIRATION RATE: 16 BRPM | DIASTOLIC BLOOD PRESSURE: 59 MMHG | HEIGHT: 64 IN | OXYGEN SATURATION: 96 %

## 2020-03-02 DIAGNOSIS — I48.91 ATRIAL FIBRILLATION, UNSPECIFIED TYPE (H): Primary | ICD-10-CM

## 2020-03-02 DIAGNOSIS — I10 BENIGN ESSENTIAL HYPERTENSION: ICD-10-CM

## 2020-03-02 DIAGNOSIS — I77.6 VASCULITIS (H): ICD-10-CM

## 2020-03-02 DIAGNOSIS — I42.9 CARDIOMYOPATHY, UNSPECIFIED TYPE (H): ICD-10-CM

## 2020-03-02 DIAGNOSIS — M32.9 SYSTEMIC LUPUS ERYTHEMATOSUS, UNSPECIFIED SLE TYPE, UNSPECIFIED ORGAN INVOLVEMENT STATUS (H): ICD-10-CM

## 2020-03-02 DIAGNOSIS — R53.81 PHYSICAL DECONDITIONING: ICD-10-CM

## 2020-03-02 DIAGNOSIS — C44.92 SCC (SQUAMOUS CELL CARCINOMA): ICD-10-CM

## 2020-03-02 DIAGNOSIS — R62.7 FAILURE TO THRIVE IN ADULT: ICD-10-CM

## 2020-03-02 DIAGNOSIS — J84.9 ILD (INTERSTITIAL LUNG DISEASE) (H): ICD-10-CM

## 2020-03-02 DIAGNOSIS — D50.9 IRON DEFICIENCY ANEMIA, UNSPECIFIED IRON DEFICIENCY ANEMIA TYPE: ICD-10-CM

## 2020-03-02 DIAGNOSIS — R63.4 LOSS OF WEIGHT: ICD-10-CM

## 2020-03-02 PROCEDURE — 99310 SBSQ NF CARE HIGH MDM 45: CPT | Performed by: NURSE PRACTITIONER

## 2020-03-02 RX ORDER — METOPROLOL TARTRATE 50 MG
75 TABLET ORAL 2 TIMES DAILY
COMMUNITY

## 2020-03-02 RX ORDER — ACETAMINOPHEN 325 MG/1
650 TABLET ORAL EVERY 6 HOURS
COMMUNITY

## 2020-03-02 RX ORDER — FERROUS SULFATE 324(65)MG
324 TABLET, DELAYED RELEASE (ENTERIC COATED) ORAL DAILY
COMMUNITY

## 2020-03-02 RX ORDER — ACETAMINOPHEN 325 MG/1
1000 TABLET ORAL 3 TIMES DAILY
COMMUNITY
End: 2020-03-02 | Stop reason: ALTCHOICE

## 2020-03-02 RX ORDER — CLOBETASOL PROPIONATE 0.5 MG/G
CREAM TOPICAL 2 TIMES DAILY
COMMUNITY

## 2020-03-02 ASSESSMENT — MIFFLIN-ST. JEOR: SCORE: 1331.39

## 2020-03-02 NOTE — LETTER
3/2/2020        RE: Marlys A Tietz  [address] [facility] [place] [address]        [place] [facility]  PRIMARY CARE PROVIDER AND CLINIC:  Ella Rodriguez MD, [address] [facility] / [place] [address]  Chief Complaint   Patient presents with     Hospital F/U     [place] Medical Record Number:  [id number]  Place of Service where encounter took place:  [facility] ([facility] [[id number]]    Marlys A Tietz  is a 75 year old  (1945), admitted to the above facility from  [facility] . Hospital stay 2/24/2020 through 2/28/2020..  Admitted to this facility for  rehab, medical management and nursing care.    HPI:    HPI information obtained from: facility chart records, facility staff, patient report, [facility] chart review and Care Everywhere [facility] chart review.     Brief Summary of Hospital Course:     This is a 75-year-old female, with a past medical history significant for mild interstitial lung disease, hypertension, osteoporosis, fibromyalgia, systemic lupus erythematosus with vasculitis and lymphocytic colitis, who was admitted to [facility] with weakness, fatigue and weight loss. Of note, seen in ED on 2/21/20 and given Clindamycin for concern of left lower leg wound infection. Seen by new PCP on 2/24/20 and was sent to ED after being found to be in atrial fibrillation with RVR and hypotensive. Cardiology was consulted who felt heart rate would improve with correction of anemia and fluid status. Lisinopril was held. An echocardiogram on 2/24/20 revealed an EF 3540% with moderate global LV hypokinesis/RV dysfunction. Heart rate did not improve with 1 L of NS and IV Metoprolol. Digoxin was initiated with transition to Metoprolol. Troponin was elevated. Thought to be secondary to demand ischemia from anemia and tachycardia. Also treated with IV Furosemide. Concern for adrenal insufficiency so received stress dose of steroids with Hydrocortisone IV.  PTA Prednisone dose was increased to 10 mg daily for vasculitis. Azathioprine was also increased for vasculitis after discussed with Rheumatology. Seen by Vascular and WOC RN for chronic left lower extremity wounds. A skin biopsy of the left lower extremity revealed squamous cell carcinoma. Iron Saturation 4% on 2/25/20. Received IV Venofer for anemia. A TCU stay was recommended for ongoing physical rehabilitation.     Updates on Status Since Skilled nursing Admission:     Complains of having left foot bumped at facility and opening up wound. Has pain in her leg that was relieved with Tylenol at home, but not at facility. Was able to take Tylenol more frequently at home and questions why she cannot have more frequently at facility. Is not aware of all of her follow-up appointments. Had a wound doctor who came to her house prior to hospital admission. Questions if he will continue to follow her. Lives in a multilevel house with her , but only lives on 1 floor. Has a home health nurse who comes in. Feels she is functioning just fine even if her  doesn't feel she is. Can transfer from bathroom to bed. Goal is to get stronger with exercises and return home.    Per staff report, refuses to have wound evaluated on left lower extremity following visit.     CODE STATUS/ADVANCE DIRECTIVES DISCUSSION:   CPR/Full code   Patient's living condition: lives with spouse  ALLERGIES: Penicillins; Shellfish-derived products; and Sulfa drugs  PAST MEDICAL HISTORY:  has a past medical history of H/O bacterial endocarditis (1979), Hyperlipidemia, Lupus (H), and MEDICAL HISTORY OF -.  PAST SURGICAL HISTORY:   has a past surgical history that includes no history of surgery.  FAMILY HISTORY: family history includes Hypertension in her mother; Osteoporosis in her mother.  SOCIAL HISTORY:   reports that she has never smoked. She has never used smokeless tobacco. She reports current alcohol use of about 0.8 standard drinks of  "alcohol per week. She reports that she does not use drugs.    Post Discharge Medication Reconciliation Status: discharge medications reconciled, continue medications without change    Current Outpatient Medications   Medication Sig Dispense Refill     acetaminophen (TYLENOL) 325 MG tablet Take 650 mg by mouth every 6 hours       apixaban ANTICOAGULANT (ELIQUIS) 2.5 MG tablet Take 2.5 mg by mouth 2 times daily       atorvastatin (LIPITOR) 40 MG tablet TAKE 1 TABLET(40 MG) BY MOUTH DAILY 30 tablet 0     azathioprine (IMURAN) 50 MG tablet Take 50 mg by mouth daily.       budesonide (ENTOCORT EC) 3 MG 24 hr capsule Take 6 mg by mouth every morning        Calcium Citrate-Vitamin D 1000-400 LIQD Take 2 Tablespoonful by mouth daily        clobetasol (TEMOVATE) 0.05 % external cream Apply topically 2 times daily to affected area       Cyanocobalamin (VITAMIN B 12 PO) Take 1,000 mcg by mouth daily       Ferrous Sulfate 324 (65 Fe) MG TBEC Take 324 mg by mouth daily every other day       metoprolol tartrate (LOPRESSOR) 50 MG tablet Take 75 mg by mouth 2 times daily       OMEPRAZOLE PO Take 20 mg by mouth daily        order for DME Equipment being ordered: Wound Care Supplies.   - 4x4 non-adhesive foam dressing   - Dermacea guaze roll () - 4' x 4.1 yards  - 4x4 Telfa island dressing/guaze-non-adhesive, non-sterile    **Do not ship order until nurse calls to place order** 1 Package 12     predniSONE (DELTASONE) 5 MG tablet Take 10 mg by mouth daily        vitamin D3 (CHOLECALCIFEROL) 70246 units (250 mcg) capsule Take 1 capsule by mouth daily       ROS:  4 point ROS including Respiratory, CV, GI and , other than that noted in the HPI,  is negative    Vitals:  /59   Pulse 95   Temp 98.6  F (37  C)   Resp 16   Ht 1.626 m (5' 4\")   Wt 85.6 kg (188 lb 12.8 oz)   SpO2 96%   BMI 32.41 kg/m     Exam:  GENERAL APPEARANCE:  in no distress, somnolent  ENT:  Mouth and posterior oropharynx normal, moist mucous " membranes  EYES:  EOM, conjunctivae, lids, pupils and irises normal  RESP:  respiratory effort and palpation of chest normal, lungs clear to auscultation , no respiratory distress  CV:  Palpation and auscultation of heart done , regular rate and rhythm, no murmur, rub, or gallop  ABDOMEN:  normal bowel sounds, soft, nontender, no hepatosplenomegaly or other masses  M/S:   Active movement of bilateral upper and lower extremities  SKIN:  ACE wrap on LLE. Dressing on RLE. Multiple seborrheic keratosis on face and arms.  NEURO:   Cranial nerves 2-12 are normal tested and grossly at patient's baseline  PSYCH:  oriented to person and place    Lab/Diagnostic data:  Labs done in SNF are in [facility]. Please refer to them using [employer] Everywhere.    ASSESSMENT/PLAN:  Atrial Fibrillation. New. With RVR noted at new PCP appointment on 2/24/20. Treated with Digoxin during hospitalization which was transitioned to Metoprolol with gradual increase. CHADS2 VASc = 5. Started on Apixaban. Follow-up with Cardiology in 4 weeks. Monitor heart rate daily.     Cardiomyopathy with EF 35-40% on 2/24/20. With moderate global LV hypokinesis/RV dysfunction. Likely related to above. Follow-up with Cardiology in 4 weeks. Repeat echocardiogram to be repeated at that time. Daily weights. Unable to tolerate Lisinopril due to hypotension. On Metoprolol as noted above.    Hypertension and Hyperlipidemia. Unable to tolerate Lisinopril during hospitalization due to hypotension. Monitor blood pressure daily. Continue Metoprolol and Atorvastatin as ordered.     Systemic Lupus Erythematous with History of Vasculitis with Chronic Lower Extremity Ulcerations and Inflammatory Arthritis. Followed by Dr. Alamo at [facility]. PTA Prednisone was increased from 5 mg to 10 mg due to vasculitis after receiving IV Hydrocortisone for stress dosing of of steroids. Azathioprine was also noted to be increased to 100 mg, but this was  not included on discharge orders. Will need to clarify with Dr. Villa. Follow-up with Dr. Rubio on 3/12/20 as scheduled. Continue wound care orders and Clobetasol as ordered. Will adjust Acetaminophen to 650 mg PO q6h as patient was taking similar dose at home.     Squamous Cell Carcinoma. Biopsy proven on LLE with positive margins on 2/27/20. Will need to arrange follow-up with Dermatology for treatment options.     Iron Deficiency Anemia. Receive Venofer infusions during hospitalization. Baseline Hemoglobin ~ 7-9. Last Hemoglobin 7.5 on 2/28/20. Repeat CBC on 3/5/20 to ensure stability. Continue Ferrous Sulfate as ordered.    Lymphocytic Colitis. Followed by Dr. Javed at [facility]. Continue Budesonide as ordered. Of note, PCP notes indicate patient was not taking medication at home.     Interstitial Lung Disease. On no medications    History of Gastritis. Continue Omeprazole as ordered.    Loss of Weight with Failure to Thrive. Upon review of documentation, limited recent data available to review, 139 lbs on 3/3/17 -> 116 lbs on 2/28/20. Noted to be less ambulatory over the past couple of years. Questionable medication compliance so  now fills pill box. Plans to transition to independent senior living in August. Transitions Coordinator to follow for appropriate discharge disposition. Question if patient may also benefit from in-house psych.     Physical Deconditioning. Secondary to recent hospitalization and co-morbidities. Physical and Occupational Therapy ordered for deconditioning.      Total time spent with patient visit at the skilled nursing facility was 35 min including patient visit and review of past records. Greater than 50% of total time spent with counseling and coordinating care due to review of past medical history, review of orders, review of medications adjusted in the hospital which were not noted in discharge summary, discussion with patient regarding TCU stay and expectations,  discussion regarding when therapy would discharge patient, review of code status, discussion with wound rn at facility regarding evaluation of wound, review of past weights, ordering labs and ensuring follow-up appointments have been coordinated.     Electronically signed by:  YEVGENIY Miles CNP                   Sincerely,        YEVGENIY Miles CNP

## 2020-03-02 NOTE — PROGRESS NOTES
[place] [facility]  PRIMARY CARE PROVIDER AND CLINIC:  Ella Rodriguez MD, [address] [facility] / [place] [address]  Chief Complaint   Patient presents with     Hospital F/U     [place] Medical Record Number:  [id number]  Place of Service where encounter took place:  [facility] [[id number]]    Marlys A Tietz  is a 75 year old  (1945), admitted to the above facility from  [facility] . Hospital stay 2/24/2020 through 2/28/2020..  Admitted to this facility for  rehab, medical management and nursing care.    HPI:    HPI information obtained from: facility chart records, facility staff, patient report, [facility] Epic chart review and Care Everywhere Epic chart review.     Brief Summary of Hospital Course:     This is a 75-year-old female, with a past medical history significant for mild interstitial lung disease, hypertension, osteoporosis, fibromyalgia, systemic lupus erythematosus with vasculitis and lymphocytic colitis, who was admitted to [facility] with weakness, fatigue and weight loss. Of note, seen in ED on 2/21/20 and given Clindamycin for concern of left lower leg wound infection. Seen by new PCP on 2/24/20 and was sent to ED after being found to be in atrial fibrillation with RVR and hypotensive. Cardiology was consulted who felt heart rate would improve with correction of anemia and fluid status. Lisinopril was held. An echocardiogram on 2/24/20 revealed an EF 3540% with moderate global LV hypokinesis/RV dysfunction. Heart rate did not improve with 1 L of NS and IV Metoprolol. Digoxin was initiated with transition to Metoprolol. Troponin was elevated. Thought to be secondary to demand ischemia from anemia and tachycardia. Also treated with IV Furosemide. Concern for adrenal insufficiency so received stress dose of steroids with Hydrocortisone IV. PTA Prednisone dose was increased to 10 mg daily for vasculitis. Azathioprine was also  increased for vasculitis after discussed with Rheumatology. Seen by Vascular and WOC RN for chronic left lower extremity wounds. A skin biopsy of the left lower extremity revealed squamous cell carcinoma. Iron Saturation 4% on 2/25/20. Received IV Venofer for anemia. A TCU stay was recommended for ongoing physical rehabilitation.     Updates on Status Since Skilled nursing Admission:     Complains of having left foot bumped at facility and opening up wound. Has pain in her leg that was relieved with Tylenol at home, but not at facility. Was able to take Tylenol more frequently at home and questions why she cannot have more frequently at facility. Is not aware of all of her follow-up appointments. Had a wound doctor who came to her house prior to hospital admission. Questions if he will continue to follow her. Lives in a multilevel house with her , but only lives on 1 floor. Has a home health nurse who comes in. Feels she is functioning just fine even if her  doesn't feel she is. Can transfer from bathroom to bed. Goal is to get stronger with exercises and return home.    Per staff report, refuses to have wound evaluated on left lower extremity following visit.     CODE STATUS/ADVANCE DIRECTIVES DISCUSSION:   CPR/Full code   Patient's living condition: lives with spouse  ALLERGIES: Penicillins; Shellfish-derived products; and Sulfa drugs  PAST MEDICAL HISTORY:  has a past medical history of H/O bacterial endocarditis (1979), Hyperlipidemia, Lupus (H), and MEDICAL HISTORY OF -.  PAST SURGICAL HISTORY:   has a past surgical history that includes no history of surgery.  FAMILY HISTORY: family history includes Hypertension in her mother; Osteoporosis in her mother.  SOCIAL HISTORY:   reports that she has never smoked. She has never used smokeless tobacco. She reports current alcohol use of about 0.8 standard drinks of alcohol per week. She reports that she does not use drugs.    Post Discharge Medication  "Reconciliation Status: discharge medications reconciled, continue medications without change    Current Outpatient Medications   Medication Sig Dispense Refill     acetaminophen (TYLENOL) 325 MG tablet Take 650 mg by mouth every 6 hours       apixaban ANTICOAGULANT (ELIQUIS) 2.5 MG tablet Take 2.5 mg by mouth 2 times daily       atorvastatin (LIPITOR) 40 MG tablet TAKE 1 TABLET(40 MG) BY MOUTH DAILY 30 tablet 0     azathioprine (IMURAN) 50 MG tablet Take 50 mg by mouth daily.       budesonide (ENTOCORT EC) 3 MG 24 hr capsule Take 6 mg by mouth every morning        Calcium Citrate-Vitamin D 1000-400 LIQD Take 2 Tablespoonful by mouth daily        clobetasol (TEMOVATE) 0.05 % external cream Apply topically 2 times daily to affected area       Cyanocobalamin (VITAMIN B 12 PO) Take 1,000 mcg by mouth daily       Ferrous Sulfate 324 (65 Fe) MG TBEC Take 324 mg by mouth daily every other day       metoprolol tartrate (LOPRESSOR) 50 MG tablet Take 75 mg by mouth 2 times daily       OMEPRAZOLE PO Take 20 mg by mouth daily        order for DME Equipment being ordered: Wound Care Supplies.   - 4x4 non-adhesive foam dressing   - Dermacea guaze roll () - 4' x 4.1 yards  - 4x4 Telfa island dressing/guaze-non-adhesive, non-sterile    **Do not ship order until nurse calls to place order** 1 Package 12     predniSONE (DELTASONE) 5 MG tablet Take 10 mg by mouth daily        vitamin D3 (CHOLECALCIFEROL) 44253 units (250 mcg) capsule Take 1 capsule by mouth daily       ROS:  4 point ROS including Respiratory, CV, GI and , other than that noted in the HPI,  is negative    Vitals:  /59   Pulse 95   Temp 98.6  F (37  C)   Resp 16   Ht 1.626 m (5' 4\")   Wt 85.6 kg (188 lb 12.8 oz)   SpO2 96%   BMI 32.41 kg/m    Exam:  GENERAL APPEARANCE:  in no distress, somnolent  ENT:  Mouth and posterior oropharynx normal, moist mucous membranes  EYES:  EOM, conjunctivae, lids, pupils and irises normal  RESP:  respiratory effort and " palpation of chest normal, lungs clear to auscultation , no respiratory distress  CV:  Palpation and auscultation of heart done , regular rate and rhythm, no murmur, rub, or gallop  ABDOMEN:  normal bowel sounds, soft, nontender, no hepatosplenomegaly or other masses  M/S:   Active movement of bilateral upper and lower extremities  SKIN:  ACE wrap on LLE. Dressing on RLE. Multiple seborrheic keratosis on face and arms.  NEURO:   Cranial nerves 2-12 are normal tested and grossly at patient's baseline  PSYCH:  oriented to person and place    Lab/Diagnostic data:  Labs done in SNF are in [facility]. Please refer to them using [employer] Everywhere.    ASSESSMENT/PLAN:  Atrial Fibrillation. New. With RVR noted at new PCP appointment on 2/24/20. Treated with Digoxin during hospitalization which was transitioned to Metoprolol with gradual increase. CHADS2 VASc = 5. Started on Apixaban. Follow-up with Cardiology in 4 weeks. Monitor heart rate daily.     Cardiomyopathy with EF 35-40% on 2/24/20. With moderate global LV hypokinesis/RV dysfunction. Likely related to above. Follow-up with Cardiology in 4 weeks. Repeat echocardiogram to be repeated at that time. Daily weights. Unable to tolerate Lisinopril due to hypotension. On Metoprolol as noted above.    Hypertension and Hyperlipidemia. Unable to tolerate Lisinopril during hospitalization due to hypotension. Monitor blood pressure daily. Continue Metoprolol and Atorvastatin as ordered.     Systemic Lupus Erythematous with History of Vasculitis with Chronic Lower Extremity Ulcerations and Inflammatory Arthritis. Followed by Dr. Alamo at [facility]. PTA Prednisone was increased from 5 mg to 10 mg due to vasculitis after receiving IV Hydrocortisone for stress dosing of of steroids. Azathioprine was also noted to be increased to 100 mg, but this was not included on discharge orders. Will need to clarify with Dr. Villa. Follow-up with Dr. Rubio on  3/12/20 as scheduled. Continue wound care orders and Clobetasol as ordered. Will adjust Acetaminophen to 650 mg PO q6h as patient was taking similar dose at home.     Squamous Cell Carcinoma. Biopsy proven on LLE with positive margins on 2/27/20. Will need to arrange follow-up with Dermatology for treatment options.     Iron Deficiency Anemia. Receive Venofer infusions during hospitalization. Baseline Hemoglobin ~ 7-9. Last Hemoglobin 7.5 on 2/28/20. Repeat CBC on 3/5/20 to ensure stability. Continue Ferrous Sulfate as ordered.    Leukocytosis. May be related to stress dose of steroids. WBC 11.9 on 2/24/20. Repeat CBC with differential on 3/5/20 to ensure stability.    Thrombocytosis. May be related to above. Has occurred intermittently in the past. Last Platelet Count 456 on 2/24/20. Repeat CBC on 3/5/20 to ensure stability.    Lymphocytic Colitis. Followed by Dr[facility] Javed at [facility]. Continue Budesonide as ordered. Of note, PCP notes indicate patient was not taking medication at home.     Interstitial Lung Disease. On no medications    History of Gastritis. Continue Omeprazole as ordered.    Loss of Weight with Failure to Thrive. Upon review of documentation, limited recent data available to review, 139 lbs on 3/3/17 -> 116 lbs on 2/28/20. Noted to be less ambulatory over the past couple of years. Questionable medication compliance so  now fills pill box. Difficult for patient to go out for medical appointments. Plans to transition to independent senior living in August. Transitions Coordinator to follow for appropriate discharge disposition. Question if patient may also benefit from in-house psych.     Physical Deconditioning. Secondary to recent hospitalization and co-morbidities. Physical and Occupational Therapy ordered for deconditioning.      Total time spent with patient visit at the skilled nursing facility was 35 min including patient visit and review of past records. Greater than 50% of total  time spent with counseling and coordinating care due to review of past medical history, review of orders, review of medications adjusted in the hospital which were not noted in discharge summary, discussion with patient regarding TCU stay and expectations, discussion regarding when therapy would discharge patient, review of code status, discussion with wound rn at facility regarding evaluation of wound, review of past weights, ordering labs and ensuring follow-up appointments have been coordinated.     Electronically signed by:  YEVGENIY Miles CNP

## 2020-03-05 ENCOUNTER — RECORDS - HEALTHEAST (OUTPATIENT)
Dept: LAB | Facility: CLINIC | Age: 75
End: 2020-03-05

## 2020-03-05 LAB
ANION GAP SERPL CALCULATED.3IONS-SCNC: 10 MMOL/L (ref 5–18)
BUN SERPL-MCNC: 14 MG/DL (ref 8–28)
CALCIUM SERPL-MCNC: 8 MG/DL (ref 8.5–10.5)
CHLORIDE BLD-SCNC: 106 MMOL/L (ref 98–107)
CO2 SERPL-SCNC: 24 MMOL/L (ref 22–31)
CREAT SERPL-MCNC: 0.54 MG/DL (ref 0.6–1.1)
ERYTHROCYTE [DISTWIDTH] IN BLOOD BY AUTOMATED COUNT: 21.8 % (ref 11–14.5)
GFR SERPL CREATININE-BSD FRML MDRD: >60 ML/MIN/1.73M2
GLUCOSE BLD-MCNC: 55 MG/DL (ref 70–125)
HCT VFR BLD AUTO: 27.4 % (ref 35–47)
HGB BLD-MCNC: 7.4 G/DL (ref 12–16)
MCH RBC QN AUTO: 25.5 PG (ref 27–34)
MCHC RBC AUTO-ENTMCNC: 27 G/DL (ref 32–36)
MCV RBC AUTO: 95 FL (ref 80–100)
PLATELET # BLD AUTO: 395 THOU/UL (ref 140–440)
PMV BLD AUTO: 9.9 FL (ref 8.5–12.5)
POTASSIUM BLD-SCNC: 4.1 MMOL/L (ref 3.5–5)
RBC # BLD AUTO: 2.9 MILL/UL (ref 3.8–5.4)
SODIUM SERPL-SCNC: 140 MMOL/L (ref 136–145)
WBC: 12.9 THOU/UL (ref 4–11)

## 2020-04-10 ENCOUNTER — RECORDS - HEALTHEAST (OUTPATIENT)
Dept: LAB | Facility: CLINIC | Age: 75
End: 2020-04-10

## 2020-04-10 LAB
ALBUMIN SERPL-MCNC: 2.7 G/DL (ref 3.5–5)
ALT SERPL W P-5'-P-CCNC: 16 U/L (ref 0–45)
AST SERPL W P-5'-P-CCNC: 18 U/L (ref 0–40)
BASOPHILS # BLD AUTO: 0 THOU/UL (ref 0–0.2)
BASOPHILS NFR BLD AUTO: 0 % (ref 0–2)
CREAT SERPL-MCNC: 0.51 MG/DL (ref 0.6–1.1)
EOSINOPHIL COUNT (ABSOLUTE): 0.1 THOU/UL (ref 0–0.4)
EOSINOPHIL NFR BLD AUTO: 1 % (ref 0–6)
ERYTHROCYTE [DISTWIDTH] IN BLOOD BY AUTOMATED COUNT: 23.9 % (ref 11–14.5)
GFR SERPL CREATININE-BSD FRML MDRD: >60 ML/MIN/1.73M2
HCT VFR BLD AUTO: 36.9 % (ref 35–47)
HGB BLD-MCNC: 10.7 G/DL (ref 12–16)
LYMPHOCYTES # BLD AUTO: 1.2 THOU/UL (ref 0.8–4.4)
LYMPHOCYTES NFR BLD AUTO: 17 % (ref 20–40)
MCH RBC QN AUTO: 29.3 PG (ref 27–34)
MCHC RBC AUTO-ENTMCNC: 29 G/DL (ref 32–36)
MCV RBC AUTO: 101 FL (ref 80–100)
METAMYELOCYTES (ABSOLUTE): 0 THOU/UL
METAMYELOCYTES NFR BLD MANUAL: 1 %
MONOCYTES # BLD AUTO: 0.2 THOU/UL (ref 0–0.9)
MONOCYTES NFR BLD AUTO: 3 % (ref 2–10)
MYELOCYTES (ABSOLUTE): 0.2 THOU/UL
MYELOCYTES NFR BLD MANUAL: 3 %
OVALOCYTES: ABNORMAL
PLAT MORPH BLD: NORMAL
PLATELET # BLD AUTO: 303 THOU/UL (ref 140–440)
PMV BLD AUTO: 9.5 FL (ref 8.5–12.5)
POLYCHROMASIA BLD QL SMEAR: ABNORMAL
RBC # BLD AUTO: 3.65 MILL/UL (ref 3.8–5.4)
REACTIVE LYMPHS: ABNORMAL
TOTAL NEUTROPHILS-ABS(DIFF): 5.4 THOU/UL (ref 2–7.7)
TOTAL NEUTROPHILS-REL(DIFF): 77 % (ref 50–70)
WBC: 7.1 THOU/UL (ref 4–11)

## 2020-11-07 ENCOUNTER — HEALTH MAINTENANCE LETTER (OUTPATIENT)
Age: 75
End: 2020-11-07

## 2021-03-21 ENCOUNTER — HEALTH MAINTENANCE LETTER (OUTPATIENT)
Age: 76
End: 2021-03-21

## 2021-09-05 ENCOUNTER — HEALTH MAINTENANCE LETTER (OUTPATIENT)
Age: 76
End: 2021-09-05

## 2022-04-17 ENCOUNTER — HEALTH MAINTENANCE LETTER (OUTPATIENT)
Age: 77
End: 2022-04-17

## 2022-10-23 ENCOUNTER — HEALTH MAINTENANCE LETTER (OUTPATIENT)
Age: 77
End: 2022-10-23

## 2023-06-01 ENCOUNTER — HEALTH MAINTENANCE LETTER (OUTPATIENT)
Age: 78
End: 2023-06-01